# Patient Record
Sex: FEMALE | Race: BLACK OR AFRICAN AMERICAN | NOT HISPANIC OR LATINO | Employment: FULL TIME | URBAN - METROPOLITAN AREA
[De-identification: names, ages, dates, MRNs, and addresses within clinical notes are randomized per-mention and may not be internally consistent; named-entity substitution may affect disease eponyms.]

---

## 2024-05-08 ENCOUNTER — LAB (OUTPATIENT)
Dept: LAB | Facility: LAB | Age: 24
End: 2024-05-08

## 2024-05-08 ENCOUNTER — INITIAL PRENATAL (OUTPATIENT)
Dept: OBSTETRICS AND GYNECOLOGY | Facility: HOSPITAL | Age: 24
End: 2024-05-08

## 2024-05-08 VITALS
WEIGHT: 250 LBS | SYSTOLIC BLOOD PRESSURE: 124 MMHG | HEIGHT: 63 IN | BODY MASS INDEX: 44.3 KG/M2 | DIASTOLIC BLOOD PRESSURE: 77 MMHG

## 2024-05-08 DIAGNOSIS — Z32.01 PREGNANCY TEST POSITIVE (HHS-HCC): Primary | ICD-10-CM

## 2024-05-08 DIAGNOSIS — Z12.4 SCREENING FOR CERVICAL CANCER: ICD-10-CM

## 2024-05-08 DIAGNOSIS — Z32.01 PREGNANCY TEST POSITIVE (HHS-HCC): ICD-10-CM

## 2024-05-08 LAB
ABO GROUP (TYPE) IN BLOOD: NORMAL
ANTIBODY SCREEN: NORMAL
ERYTHROCYTE [DISTWIDTH] IN BLOOD BY AUTOMATED COUNT: 16 % (ref 11.5–14.5)
EST. AVERAGE GLUCOSE BLD GHB EST-MCNC: 117 MG/DL
FERRITIN SERPL-MCNC: 15 NG/ML
HBA1C MFR BLD: 5.7 %
HBV SURFACE AG SERPL QL IA: NONREACTIVE
HCT VFR BLD AUTO: 32.3 % (ref 36–46)
HCV AB SER QL: NONREACTIVE
HGB BLD-MCNC: 10.1 G/DL (ref 12–16)
HIV 1+2 AB+HIV1 P24 AG SERPL QL IA: NONREACTIVE
IRON SATN MFR SERPL: 12 %
IRON SERPL-MCNC: 47 UG/DL
MCH RBC QN AUTO: 24.8 PG (ref 26–34)
MCHC RBC AUTO-ENTMCNC: 31.3 G/DL (ref 32–36)
MCV RBC AUTO: 79 FL (ref 80–100)
NRBC BLD-RTO: 0 /100 WBCS (ref 0–0)
PLATELET # BLD AUTO: 300 X10*3/UL (ref 150–450)
PREGNANCY TEST URINE, POC: POSITIVE
RBC # BLD AUTO: 4.07 X10*6/UL (ref 4–5.2)
REFLEX ADDED, ANEMIA PANEL: NORMAL
RH FACTOR (ANTIGEN D): NORMAL
RUBV IGG SERPL IA-ACNC: 1.4 IA
RUBV IGG SERPL QL IA: POSITIVE
TIBC SERPL-MCNC: 386 UG/DL
TREPONEMA PALLIDUM IGG+IGM AB [PRESENCE] IN SERUM OR PLASMA BY IMMUNOASSAY: NONREACTIVE
UIBC SERPL-MCNC: 339 UG/DL
WBC # BLD AUTO: 4.3 X10*3/UL (ref 4.4–11.3)

## 2024-05-08 PROCEDURE — 83021 HEMOGLOBIN CHROMOTOGRAPHY: CPT

## 2024-05-08 PROCEDURE — 87340 HEPATITIS B SURFACE AG IA: CPT

## 2024-05-08 PROCEDURE — 83036 HEMOGLOBIN GLYCOSYLATED A1C: CPT

## 2024-05-08 PROCEDURE — 86780 TREPONEMA PALLIDUM: CPT

## 2024-05-08 PROCEDURE — 86850 RBC ANTIBODY SCREEN: CPT

## 2024-05-08 PROCEDURE — 0500F INITIAL PRENATAL CARE VISIT: CPT

## 2024-05-08 PROCEDURE — 85027 COMPLETE CBC AUTOMATED: CPT

## 2024-05-08 PROCEDURE — 83020 HEMOGLOBIN ELECTROPHORESIS: CPT

## 2024-05-08 PROCEDURE — 87491 CHLMYD TRACH DNA AMP PROBE: CPT

## 2024-05-08 PROCEDURE — 36415 COLL VENOUS BLD VENIPUNCTURE: CPT

## 2024-05-08 PROCEDURE — 86317 IMMUNOASSAY INFECTIOUS AGENT: CPT

## 2024-05-08 PROCEDURE — 86900 BLOOD TYPING SEROLOGIC ABO: CPT

## 2024-05-08 PROCEDURE — 87086 URINE CULTURE/COLONY COUNT: CPT

## 2024-05-08 PROCEDURE — 82728 ASSAY OF FERRITIN: CPT

## 2024-05-08 PROCEDURE — 83550 IRON BINDING TEST: CPT

## 2024-05-08 PROCEDURE — 87661 TRICHOMONAS VAGINALIS AMPLIF: CPT

## 2024-05-08 PROCEDURE — 86803 HEPATITIS C AB TEST: CPT

## 2024-05-08 PROCEDURE — 86901 BLOOD TYPING SEROLOGIC RH(D): CPT

## 2024-05-08 PROCEDURE — 81025 URINE PREGNANCY TEST: CPT

## 2024-05-08 PROCEDURE — 87389 HIV-1 AG W/HIV-1&-2 AB AG IA: CPT

## 2024-05-08 PROCEDURE — 88175 CYTOPATH C/V AUTO FLUID REDO: CPT | Mod: TC,GCY

## 2024-05-08 RX ORDER — NAPROXEN SODIUM 220 MG/1
162 TABLET, FILM COATED ORAL DAILY
Qty: 180 TABLET | Refills: 3 | Status: SHIPPED | OUTPATIENT
Start: 2024-05-08 | End: 2025-05-08

## 2024-05-08 RX ORDER — FERROUS SULFATE 325(65) MG
325 TABLET ORAL
Qty: 90 TABLET | Refills: 3 | Status: SHIPPED | OUTPATIENT
Start: 2024-05-08 | End: 2025-05-08

## 2024-05-08 SDOH — ECONOMIC STABILITY: TRANSPORTATION INSECURITY
IN THE PAST 12 MONTHS, HAS THE LACK OF TRANSPORTATION KEPT YOU FROM MEDICAL APPOINTMENTS OR FROM GETTING MEDICATIONS?: NO

## 2024-05-08 SDOH — ECONOMIC STABILITY: FOOD INSECURITY: WITHIN THE PAST 12 MONTHS, YOU WORRIED THAT YOUR FOOD WOULD RUN OUT BEFORE YOU GOT MONEY TO BUY MORE.: NEVER TRUE

## 2024-05-08 SDOH — ECONOMIC STABILITY: FOOD INSECURITY: WITHIN THE PAST 12 MONTHS, THE FOOD YOU BOUGHT JUST DIDN'T LAST AND YOU DIDN'T HAVE MONEY TO GET MORE.: NEVER TRUE

## 2024-05-08 SDOH — ECONOMIC STABILITY: TRANSPORTATION INSECURITY
IN THE PAST 12 MONTHS, HAS LACK OF TRANSPORTATION KEPT YOU FROM MEETINGS, WORK, OR FROM GETTING THINGS NEEDED FOR DAILY LIVING?: NO

## 2024-05-08 ASSESSMENT — EDINBURGH POSTNATAL DEPRESSION SCALE (EPDS)
I HAVE LOOKED FORWARD WITH ENJOYMENT TO THINGS: AS MUCH AS I EVER DID
THINGS HAVE BEEN GETTING ON TOP OF ME: NO, MOST OF THE TIME I HAVE COPED QUITE WELL
I HAVE BLAMED MYSELF UNNECESSARILY WHEN THINGS WENT WRONG: NOT VERY OFTEN
I HAVE BEEN SO UNHAPPY THAT I HAVE HAD DIFFICULTY SLEEPING: NOT AT ALL
I HAVE FELT SAD OR MISERABLE: NO, NOT AT ALL
I HAVE BEEN ANXIOUS OR WORRIED FOR NO GOOD REASON: YES, SOMETIMES
I HAVE FELT SCARED OR PANICKY FOR NO GOOD REASON: YES, SOMETIMES
I HAVE BEEN ABLE TO LAUGH AND SEE THE FUNNY SIDE OF THINGS: NOT QUITE SO MUCH NOW
I HAVE BEEN SO UNHAPPY THAT I HAVE BEEN CRYING: NO, NEVER
TOTAL SCORE: 7
THE THOUGHT OF HARMING MYSELF HAS OCCURRED TO ME: NEVER

## 2024-05-08 ASSESSMENT — SOCIAL DETERMINANTS OF HEALTH (SDOH)
WITHIN THE LAST YEAR, HAVE YOU BEEN KICKED, HIT, SLAPPED, OR OTHERWISE PHYSICALLY HURT BY YOUR PARTNER OR EX-PARTNER?: NO
WITHIN THE LAST YEAR, HAVE YOU BEEN AFRAID OF YOUR PARTNER OR EX-PARTNER?: NO
HOW HARD IS IT FOR YOU TO PAY FOR THE VERY BASICS LIKE FOOD, HOUSING, MEDICAL CARE, AND HEATING?: NOT HARD AT ALL
WITHIN THE LAST YEAR, HAVE TO BEEN RAPED OR FORCED TO HAVE ANY KIND OF SEXUAL ACTIVITY BY YOUR PARTNER OR EX-PARTNER?: NO
WITHIN THE LAST YEAR, HAVE YOU BEEN HUMILIATED OR EMOTIONALLY ABUSED IN OTHER WAYS BY YOUR PARTNER OR EX-PARTNER?: NO

## 2024-05-08 NOTE — PROGRESS NOTES
Assessment   Problem List Items Addressed This Visit    None  Visit Diagnoses       Pregnancy test positive ([facility])    -  Primary    Relevant Medications    prenatal vitamin, iron-folic, 27 mg iron-800 mcg folic acid tablet    ferrous sulfate, 325 mg ferrous sulfate, tablet    aspirin 81 mg chewable tablet    Other Relevant Orders    [facility] OB imaging order    Hemoglobin A1C    Type And Screen    CBC Anemia Panel With Reflex,Pregnancy    Hemoglobin Identification with Path Review    Hepatitis C Antibody    Hepatitis B Surface Antigen    Rubella Antibody, IgG    Syphilis Screen with Reflex    HIV 1/2 Antigen/Antibody Screen with Reflex to Confirmation    Urine Culture    Trichomonas vaginalis, Nucleic Acid Detection    C. Trachomatis / N. Gonorrhoeae, Amplified Detection    Follow Up In Obstetrics    Screening for cervical cancer        Relevant Orders    THINPREP PAP TEST           Plan   - New OB resources provided and reviewed with particular attention to dietary, travel, and medication restrictions  - Oriented to practice, CNM vs. MD care  - Reviewed IOM recommendations for weight gain given pt's BMI: 11-20 pounds (BMI greater than or equal to 30)  - Reviewed bleeding precautions, warning signs, when to call provider; phone number provided  - Discussed bASA for PEC prophylaxis  - The following Rx were sent to pharmacy: PNV, ASA, iron  - Routine NOB labs ordered  - Nuchal Translucency ultrasound ordered  - Return in 4 weeks for routine prenatal care    MARÍA Nuñez-CNM    Subjective   Dia Aaron is a 24 y.o.  at 10w0d with a working estimated date of delivery of 2024, by Last Menstrual Period who presents for an initial prenatal visit. This pregnancy is unplanned.    Patient currently experiencing: nausea, declines anti-emetics, fatigue  Bleeding or cramping since LMP: no  Taking prenatal vitamin: No  Ultrasound completed this pregnancy: No    Last pap: never; completed today    OB  History    Para Term  AB Living   2       1     SAB IAB Ectopic Multiple Live Births                  # Outcome Date GA Lbr Joss/2nd Weight Sex Delivery Anes PTL Lv   2 Current            1 AB              [place]  Depression Scale Total: 7  Prior pregnancy complications:  N/A    History of hypertension:  No    History reviewed. No pertinent past medical history.   History reviewed. No pertinent surgical history.   Social History     Socioeconomic History    Marital status: Single     Spouse name: None    Number of children: None    Years of education: None    Highest education level: None   Occupational History    None   Tobacco Use    Smoking status: Never    Smokeless tobacco: Never   Vaping Use    Vaping status: Never Used   Substance and Sexual Activity    Alcohol use: Yes    Drug use: Yes     Types: Marijuana    Sexual activity: Yes     Partners: Male   Other Topics Concern    None   Social History Narrative    None     Social Determinants of Health     Financial Resource Strain: Low Risk  (2024)    Overall Financial Resource Strain (CARDIA)     Difficulty of Paying Living Expenses: Not hard at all   Food Insecurity: No Food Insecurity (2024)    Hunger Vital Sign     Worried About Running Out of Food in the Last Year: Never true     Ran Out of Food in the Last Year: Never true   Transportation Needs: No Transportation Needs (2024)    PRAPARE - Transportation     Lack of Transportation (Medical): No     Lack of Transportation (Non-Medical): No   Physical Activity: Not on file   Stress: Not on file   Social Connections: Not on file   Intimate Partner Violence: Not At Risk (2024)    Humiliation, Afraid, Rape, and Kick questionnaire     Fear of Current or Ex-Partner: No     Emotionally Abused: No     Physically Abused: No     Sexually Abused: No        Objective   Physical Exam  Weight: 113 kg (250 lb)  TW kg (0 lb)   Expected Total Weight Gain: 5 kg (11 lb)-9 kg (19 lb)    Pregravid BMI: 44.30  BP: 124/77    Physical Exam  Constitutional:       Appearance: Normal appearance.   Genitourinary:      Vulva, bladder, rectum and urethral meatus normal.      No vaginal prolapse present.     No vaginal atrophy present.     Pelvic exam was performed with patient in the lithotomy position and normal support.   HENT:      Mouth/Throat:      Mouth: Mucous membranes are moist.   Cardiovascular:      Rate and Rhythm: Normal rate and regular rhythm.      Heart sounds: Normal heart sounds.   Pulmonary:      Effort: Pulmonary effort is normal.      Breath sounds: Normal breath sounds.   Musculoskeletal:         General: Normal range of motion.      Cervical back: Normal range of motion and neck supple.   Neurological:      Mental Status: She is alert and oriented to person, place, and time.   Skin:     General: Skin is warm and dry.   Psychiatric:         Mood and Affect: Mood normal.         Behavior: Behavior normal.         Thought Content: Thought content normal.         Judgment: Judgment normal.

## 2024-05-09 LAB
BACTERIA UR CULT: NORMAL
C TRACH RRNA SPEC QL NAA+PROBE: NEGATIVE
HEMOGLOBIN A2: 2.6 % (ref 2–3.5)
HEMOGLOBIN A: 97 % (ref 95.8–98)
HEMOGLOBIN F: 0.4 % (ref 0–2)
HEMOGLOBIN IDENTIFICATION INTERPRETATION: NORMAL
N GONORRHOEA DNA SPEC QL PROBE+SIG AMP: NEGATIVE
PATH REVIEW-HGB IDENTIFICATION: NORMAL
T VAGINALIS RRNA SPEC QL NAA+PROBE: NEGATIVE

## 2024-05-19 LAB
CYTOLOGY CMNT CVX/VAG CYTO-IMP: NORMAL
LAB AP HPV HR: NORMAL
LABORATORY COMMENT REPORT: NORMAL
LMP START DATE: NORMAL
MENSTRUAL HX REPORTED: NORMAL
PATH REPORT.TOTAL CANCER: NORMAL

## 2024-06-05 ENCOUNTER — HOSPITAL ENCOUNTER (OUTPATIENT)
Dept: RADIOLOGY | Facility: HOSPITAL | Age: 24
Discharge: HOME | End: 2024-06-05

## 2024-06-05 ENCOUNTER — ROUTINE PRENATAL (OUTPATIENT)
Dept: OBSTETRICS AND GYNECOLOGY | Facility: HOSPITAL | Age: 24
End: 2024-06-05

## 2024-06-05 VITALS — DIASTOLIC BLOOD PRESSURE: 68 MMHG | WEIGHT: 240 LBS | BODY MASS INDEX: 42.51 KG/M2 | SYSTOLIC BLOOD PRESSURE: 119 MMHG

## 2024-06-05 DIAGNOSIS — Z3A.14 14 WEEKS GESTATION OF PREGNANCY (HHS-HCC): Primary | ICD-10-CM

## 2024-06-05 DIAGNOSIS — Z32.01 PREGNANCY TEST POSITIVE (HHS-HCC): ICD-10-CM

## 2024-06-05 DIAGNOSIS — O99.210 OBESITY IN PREGNANCY (HHS-HCC): ICD-10-CM

## 2024-06-05 PROCEDURE — 76801 OB US < 14 WKS SINGLE FETUS: CPT

## 2024-06-05 PROCEDURE — 76801 OB US < 14 WKS SINGLE FETUS: CPT | Performed by: OBSTETRICS & GYNECOLOGY

## 2024-06-05 PROCEDURE — 0501F PRENATAL FLOW SHEET: CPT

## 2024-06-05 NOTE — PROGRESS NOTES
Subjective     Dia Aaron is a 24 y.o.  at 14w0d with a working estimated date of delivery of 2024, by Last Menstrual Period who presents for a routine prenatal visit.     She denies vaginal bleeding, leakage of fluid, decreased fetal movements, or contractions.    Objective   Physical Exam  Weight: 109 kg (240 lb)  TWG: -4.536 kg (-10 lb)  BP: 119/68  Fetal Heart Rate: 155    Postpartum Depression: Medium Risk (2024)    [place]  Depression Scale     Last EPDS Total Score: 7     Last EPDS Self Harm Result: Never       Assessment/Plan   Problem List Items Addressed This Visit       Obesity in pregnancy ([facility])    Overview     Pre-pregnancy BMI 44  Growth 30, 36 weeks  Weekly  testing beginning at 34 weeks          Other Visit Diagnoses       14 weeks gestation of pregnancy ([facility])    -  Primary    Relevant Orders    Follow Up In Obstetrics     Anatomy US scheduled  NT ultrasound today at the conclusion of this appointment  Reviewed s/sx of PTL, warning signs, fetal movement counts, and when to call provider  Follow up in 4 weeks for a routine prenatal visit.    Nathalie Dhaliwal, MARÍA-WHITNEY

## 2024-07-03 ENCOUNTER — ROUTINE PRENATAL (OUTPATIENT)
Dept: OBSTETRICS AND GYNECOLOGY | Facility: HOSPITAL | Age: 24
End: 2024-07-03

## 2024-07-03 VITALS — BODY MASS INDEX: 44.11 KG/M2 | SYSTOLIC BLOOD PRESSURE: 124 MMHG | WEIGHT: 249 LBS | DIASTOLIC BLOOD PRESSURE: 76 MMHG

## 2024-07-03 DIAGNOSIS — O99.012 ANEMIA DURING PREGNANCY IN SECOND TRIMESTER (HHS-HCC): ICD-10-CM

## 2024-07-03 DIAGNOSIS — Z3A.18 18 WEEKS GESTATION OF PREGNANCY (HHS-HCC): Primary | ICD-10-CM

## 2024-07-03 DIAGNOSIS — O99.210 OBESITY IN PREGNANCY (HHS-HCC): ICD-10-CM

## 2024-07-03 PROCEDURE — 0501F PRENATAL FLOW SHEET: CPT

## 2024-07-03 NOTE — PROGRESS NOTES
Subjective     Dia Aaron is a 24 y.o.  at 18w0d with a working estimated date of delivery of 2024, by Last Menstrual Period who presents for a routine prenatal visit.     She denies vaginal bleeding, leakage of fluid, decreased fetal movements, or contractions.    Objective   Physical Exam  Weight: 113 kg (249 lb)  TWG: -0.454 kg (-1 lb)  BP: 124/76  Fetal Heart Rate: 157    Postpartum Depression: Medium Risk (2024)    [place]  Depression Scale     Last EPDS Total Score: 7     Last EPDS Self Harm Result: Never     Assessment/Plan   Problem List Items Addressed This Visit       Obesity in pregnancy ([facility])    Overview     Pre-pregnancy BMI 44  Growth 30, 36 weeks  Weekly  testing beginning at 34 weeks         Anemia during pregnancy in second trimester ([facility])    Overview     Hemoglobin 10.1 on   PO iron ordered  Repeat at 24-28 week labs          Other Visit Diagnoses       18 weeks gestation of pregnancy ([facility])    -  Primary    Relevant Orders    Follow Up In Obstetrics          Anatomy US scheduled  Reviewed s/sx of PTL, warning signs, fetal movement counts, and when to call provider  Follow up in 4 weeks for a routine prenatal visit.    MARÍA Nuñez-WHITNEY

## 2024-07-17 ENCOUNTER — HOSPITAL ENCOUNTER (OUTPATIENT)
Dept: RADIOLOGY | Facility: HOSPITAL | Age: 24
Discharge: HOME | End: 2024-07-17

## 2024-07-17 DIAGNOSIS — Z32.01 PREGNANCY TEST POSITIVE (HHS-HCC): ICD-10-CM

## 2024-07-17 PROCEDURE — 76811 OB US DETAILED SNGL FETUS: CPT | Performed by: OBSTETRICS & GYNECOLOGY

## 2024-07-17 PROCEDURE — 76811 OB US DETAILED SNGL FETUS: CPT

## 2024-07-28 ENCOUNTER — HOSPITAL ENCOUNTER (OUTPATIENT)
Facility: HOSPITAL | Age: 24
Discharge: HOME | End: 2024-07-28
Attending: OBSTETRICS & GYNECOLOGY | Admitting: OBSTETRICS & GYNECOLOGY

## 2024-07-28 ENCOUNTER — HOSPITAL ENCOUNTER (OUTPATIENT)
Facility: HOSPITAL | Age: 24
End: 2024-07-28
Attending: OBSTETRICS & GYNECOLOGY | Admitting: OBSTETRICS & GYNECOLOGY

## 2024-07-28 VITALS
RESPIRATION RATE: 18 BRPM | WEIGHT: 251.1 LBS | SYSTOLIC BLOOD PRESSURE: 136 MMHG | BODY MASS INDEX: 44.48 KG/M2 | TEMPERATURE: 97.9 F | DIASTOLIC BLOOD PRESSURE: 66 MMHG | HEART RATE: 83 BPM

## 2024-07-28 PROBLEM — Z34.02 PRIMIGRAVIDA IN SECOND TRIMESTER (HHS-HCC): Status: ACTIVE | Noted: 2024-07-28

## 2024-07-28 PROBLEM — R10.2 PAIN OF ROUND LIGAMENT DURING PREGNANCY (HHS-HCC): Status: ACTIVE | Noted: 2024-07-28

## 2024-07-28 PROBLEM — R19.8: Status: ACTIVE | Noted: 2024-07-28

## 2024-07-28 PROBLEM — Z3A.21 21 WEEKS GESTATION OF PREGNANCY (HHS-HCC): Status: ACTIVE | Noted: 2024-07-28

## 2024-07-28 PROBLEM — O26.892: Status: ACTIVE | Noted: 2024-07-28

## 2024-07-28 PROBLEM — O26.899 PAIN OF ROUND LIGAMENT DURING PREGNANCY (HHS-HCC): Status: ACTIVE | Noted: 2024-07-28

## 2024-07-28 PROCEDURE — 2500000001 HC RX 250 WO HCPCS SELF ADMINISTERED DRUGS (ALT 637 FOR MEDICARE OP): Performed by: ADVANCED PRACTICE MIDWIFE

## 2024-07-28 PROCEDURE — 99213 OFFICE O/P EST LOW 20 MIN: CPT

## 2024-07-28 PROCEDURE — 99213 OFFICE O/P EST LOW 20 MIN: CPT | Performed by: ADVANCED PRACTICE MIDWIFE

## 2024-07-28 RX ORDER — LIDOCAINE HYDROCHLORIDE 10 MG/ML
0.5 INJECTION, SOLUTION EPIDURAL; INFILTRATION; INTRACAUDAL; PERINEURAL ONCE AS NEEDED
Status: DISCONTINUED | OUTPATIENT
Start: 2024-07-28 | End: 2024-07-28 | Stop reason: HOSPADM

## 2024-07-28 RX ORDER — ACETAMINOPHEN 325 MG/1
975 TABLET ORAL EVERY 6 HOURS PRN
Status: DISCONTINUED | OUTPATIENT
Start: 2024-07-28 | End: 2024-07-28 | Stop reason: HOSPADM

## 2024-07-28 RX ORDER — HYDRALAZINE HYDROCHLORIDE 20 MG/ML
5 INJECTION INTRAMUSCULAR; INTRAVENOUS ONCE AS NEEDED
Status: DISCONTINUED | OUTPATIENT
Start: 2024-07-28 | End: 2024-07-28 | Stop reason: HOSPADM

## 2024-07-28 RX ORDER — LABETALOL HYDROCHLORIDE 5 MG/ML
20 INJECTION, SOLUTION INTRAVENOUS ONCE AS NEEDED
Status: DISCONTINUED | OUTPATIENT
Start: 2024-07-28 | End: 2024-07-28 | Stop reason: HOSPADM

## 2024-07-28 RX ORDER — FAMOTIDINE 20 MG/1
20 TABLET, FILM COATED ORAL 2 TIMES DAILY
Status: DISCONTINUED | OUTPATIENT
Start: 2024-07-28 | End: 2024-07-28 | Stop reason: HOSPADM

## 2024-07-28 RX ORDER — NIFEDIPINE 10 MG/1
10 CAPSULE ORAL ONCE AS NEEDED
Status: DISCONTINUED | OUTPATIENT
Start: 2024-07-28 | End: 2024-07-28 | Stop reason: HOSPADM

## 2024-07-28 RX ORDER — ONDANSETRON HYDROCHLORIDE 2 MG/ML
4 INJECTION, SOLUTION INTRAVENOUS EVERY 6 HOURS PRN
Status: DISCONTINUED | OUTPATIENT
Start: 2024-07-28 | End: 2024-07-28 | Stop reason: HOSPADM

## 2024-07-28 RX ORDER — ONDANSETRON 4 MG/1
4 TABLET, FILM COATED ORAL EVERY 6 HOURS PRN
Status: DISCONTINUED | OUTPATIENT
Start: 2024-07-28 | End: 2024-07-28 | Stop reason: HOSPADM

## 2024-07-28 RX ADMIN — FAMOTIDINE 20 MG: 20 TABLET, FILM COATED ORAL at 11:40

## 2024-07-28 RX ADMIN — ACETAMINOPHEN 975 MG: 325 TABLET ORAL at 11:40

## 2024-07-28 ASSESSMENT — PAIN SCALES - GENERAL
PAINLEVEL_OUTOF10: 3
PAINLEVEL_OUTOF10: 5 - MODERATE PAIN
PAINLEVEL_OUTOF10: 7

## 2024-07-28 NOTE — H&P
Obstetrical Triage Note    Reason for Triage Observation: Abdominal Pain (LUQ pain)    Assessment   pain in abdomen  Triage Testing revealed No uterine contractions,   Patient's complaints have improved.   Ligament pain and heartburn    Plan   Pepcid and Tylenol given reports continuing improvement of pain  Discharge home. Follow up as scheduled for next prenatal visit    Subjective   History of Present Pregnancy  Dia Aaron is a 24 y.o.  gravid, female. Patient's last menstrual period was 2024. with an Estimated Date of Delivery of 2024, by Last Menstrual Period, who is now 21w4d gestation. The patient's blood type is A POS. Presents to triage from work with c/o LUQ pain that started yesterday and has improved for a 10 last night to a 7 this morning.  Ate pizza last night. No intercourse, denies bleeding or leakage of fluid, feels fetal movement    Pregnancy notable for: BMI 44, anemia    Objective   Recent Vital Signs:                                 /66   Pulse 83   Temp 36.6 °C (97.9 °F) (Axillary)   Resp 18   Wt 114 kg (251 lb 1.7 oz)   BMI 44.48 kg/m²     BP & Temp Min/Max Last 24 Hours:     BP  Min: 136/66   Min taken time: 24 1009  Max: 136/66   Max taken time: 24 1009  Temp  Av.6 °C (97.9 °F)  Min: 36.6 °C (97.9 °F)   Min taken time: 24 1009  Max: 36.6 °C (97.9 °F)   Max taken time: 24 1009    Physical Examination:  Constitutional: Alert and in no acute distress. Well developed, well nourished.  Head and Face: Head and face: Normal.   External inspection of ears and nose: Normal.  Pulmonary: Normal respiratory effort.  Uterus: Normal.   Psychiatric: Alert and oriented x 3. Affect normal to patient baseline. Mood: Appropriate  Fetal:   No ctxs per EFM for 1 hour    MARÍA Robles-WHITNEY

## 2024-07-30 ENCOUNTER — APPOINTMENT (OUTPATIENT)
Dept: OBSTETRICS AND GYNECOLOGY | Facility: HOSPITAL | Age: 24
End: 2024-07-30

## 2024-08-20 ENCOUNTER — ROUTINE PRENATAL (OUTPATIENT)
Dept: OBSTETRICS AND GYNECOLOGY | Facility: HOSPITAL | Age: 24
End: 2024-08-20

## 2024-08-20 ENCOUNTER — LAB (OUTPATIENT)
Dept: LAB | Facility: LAB | Age: 24
End: 2024-08-20

## 2024-08-20 VITALS — BODY MASS INDEX: 44.64 KG/M2 | SYSTOLIC BLOOD PRESSURE: 131 MMHG | DIASTOLIC BLOOD PRESSURE: 79 MMHG | WEIGHT: 252 LBS

## 2024-08-20 DIAGNOSIS — O99.210 OBESITY IN PREGNANCY (HHS-HCC): ICD-10-CM

## 2024-08-20 DIAGNOSIS — O99.012 ANEMIA DURING PREGNANCY IN SECOND TRIMESTER (HHS-HCC): ICD-10-CM

## 2024-08-20 DIAGNOSIS — Z3A.24 24 WEEKS GESTATION OF PREGNANCY (HHS-HCC): ICD-10-CM

## 2024-08-20 DIAGNOSIS — Z34.02 PRIMIGRAVIDA IN SECOND TRIMESTER (HHS-HCC): ICD-10-CM

## 2024-08-20 DIAGNOSIS — Z34.02 PRIMIGRAVIDA IN SECOND TRIMESTER (HHS-HCC): Primary | ICD-10-CM

## 2024-08-20 DIAGNOSIS — Z3A.18 18 WEEKS GESTATION OF PREGNANCY (HHS-HCC): ICD-10-CM

## 2024-08-20 LAB
ERYTHROCYTE [DISTWIDTH] IN BLOOD BY AUTOMATED COUNT: 14.9 % (ref 11.5–14.5)
FERRITIN SERPL-MCNC: 15 NG/ML
FERRITIN SERPL-MCNC: 19 NG/ML
FOLATE SERPL-MCNC: 18.6 NG/ML
GLUCOSE 1H P 50 G GLC PO SERPL-MCNC: 95 MG/DL
HCT VFR BLD AUTO: 31 % (ref 36–46)
HGB BLD-MCNC: 10.1 G/DL (ref 12–16)
HGB RETIC QN: 32 PG (ref 28–38)
IMMATURE RETIC FRACTION: 20.7 %
IRON SATN MFR SERPL: 17 %
IRON SERPL-MCNC: 66 UG/DL
MCH RBC QN AUTO: 28.5 PG (ref 26–34)
MCHC RBC AUTO-ENTMCNC: 32.6 G/DL (ref 32–36)
MCV RBC AUTO: 87 FL (ref 80–100)
NRBC BLD-RTO: 0 /100 WBCS (ref 0–0)
PLATELET # BLD AUTO: 242 X10*3/UL (ref 150–450)
RBC # BLD AUTO: 3.55 X10*6/UL (ref 4–5.2)
REFLEX ADDED, ANEMIA PANEL: NORMAL
RETICS #: 0.05 X10*6/UL (ref 0.02–0.08)
RETICS/RBC NFR AUTO: 1.4 % (ref 0.5–2)
TIBC SERPL-MCNC: 389 UG/DL
TREPONEMA PALLIDUM IGG+IGM AB [PRESENCE] IN SERUM OR PLASMA BY IMMUNOASSAY: NONREACTIVE
UIBC SERPL-MCNC: 323 UG/DL
VIT B12 SERPL-MCNC: 323 PG/ML
WBC # BLD AUTO: 4.5 X10*3/UL (ref 4.4–11.3)

## 2024-08-20 PROCEDURE — 36415 COLL VENOUS BLD VENIPUNCTURE: CPT

## 2024-08-20 PROCEDURE — 85045 AUTOMATED RETICULOCYTE COUNT: CPT

## 2024-08-20 PROCEDURE — 82607 VITAMIN B-12: CPT

## 2024-08-20 PROCEDURE — 83550 IRON BINDING TEST: CPT

## 2024-08-20 PROCEDURE — 86780 TREPONEMA PALLIDUM: CPT

## 2024-08-20 PROCEDURE — 82746 ASSAY OF FOLIC ACID SERUM: CPT

## 2024-08-20 PROCEDURE — 82728 ASSAY OF FERRITIN: CPT

## 2024-08-20 PROCEDURE — 0501F PRENATAL FLOW SHEET: CPT | Performed by: ADVANCED PRACTICE MIDWIFE

## 2024-08-20 PROCEDURE — 85027 COMPLETE CBC AUTOMATED: CPT

## 2024-08-20 PROCEDURE — 82947 ASSAY GLUCOSE BLOOD QUANT: CPT

## 2024-08-20 ASSESSMENT — EDINBURGH POSTNATAL DEPRESSION SCALE (EPDS)
TOTAL SCORE: 7
I HAVE BEEN SO UNHAPPY THAT I HAVE BEEN CRYING: ONLY OCCASIONALLY
I HAVE FELT SAD OR MISERABLE: NO, NOT AT ALL
I HAVE BEEN SO UNHAPPY THAT I HAVE HAD DIFFICULTY SLEEPING: NOT AT ALL
I HAVE BLAMED MYSELF UNNECESSARILY WHEN THINGS WENT WRONG: NOT VERY OFTEN
I HAVE BEEN ABLE TO LAUGH AND SEE THE FUNNY SIDE OF THINGS: AS MUCH AS I ALWAYS COULD
I HAVE BEEN ANXIOUS OR WORRIED FOR NO GOOD REASON: YES, SOMETIMES
I HAVE LOOKED FORWARD WITH ENJOYMENT TO THINGS: AS MUCH AS I EVER DID
I HAVE FELT SCARED OR PANICKY FOR NO GOOD REASON: YES, SOMETIMES
THE THOUGHT OF HARMING MYSELF HAS OCCURRED TO ME: NEVER
THINGS HAVE BEEN GETTING ON TOP OF ME: NO, MOST OF THE TIME I HAVE COPED QUITE WELL

## 2024-08-20 ASSESSMENT — ENCOUNTER SYMPTOMS
OCCASIONAL FEELINGS OF UNSTEADINESS: 0
LOSS OF SENSATION IN FEET: 0
DEPRESSION: 0

## 2024-08-20 NOTE — PROGRESS NOTES
Subjective     Dia Aaron is a 24 y.o.  at 24w6d with a working estimated date of delivery of 2024, by Last Menstrual Period who presents for a routine prenatal visit.     She denies vaginal bleeding, leakage of fluid, decreased fetal movements, or contractions.    Anemia - taking iron inconsistently - maybe once or twice a week     Feeling well, no complaints     Objective   Physical Exam  Weight: 114 kg (252 lb)  Expected Total Weight Gain: 5 kg (11 lb)-9 kg (19 lb)   Pregravid BMI: 44.30  BP: 131/79  Fetal Heart Rate: 152 Fundal Height (cm): 25 cm    Postpartum Depression: Medium Risk (2024)    [place]  Depression Scale     Last EPDS Total Score: 7     Last EPDS Self Harm Result: Never       Problem List Items Addressed This Visit       Primigravida in second trimester ([facility]) - Primary    Relevant Orders    Syphilis Screen with Reflex    CBC Anemia Panel With Reflex,Pregnancy    Glucose, 1 Hour Screen, Pregnancy    Obesity in pregnancy ([facility])    Overview     Pre-pregnancy BMI 44  Growth 30, 36 weeks  Weekly  testing beginning at 34 weeks         Anemia during pregnancy in second trimester ([facility])    Overview     Hemoglobin 10.1 on   PO iron ordered  Repeat at 24-28 week labs         24 weeks gestation of pregnancy ([facility])       Anatomy US reviewed WNL  Growth US scheduled for 30 wks for obesity   Discussed diabetes screening and routine labs, to be completed today  Anemia - taking iron inconsistently - will recheck levels today   Reviewed childbirth education options - list of courses give   Discussed Tdap at next visit   -Reviewed reasons to call CNM on-call: decreased fetal movement, vaginal bleeding, loss of fluid, increased pelvic pain/contractions, or any questions/concerns  -RTC in 4 weeks or prn      MARÍA Villagomez-WHITNEY, APRN-CNP

## 2024-08-21 ENCOUNTER — TELEPHONE (OUTPATIENT)
Dept: OBSTETRICS AND GYNECOLOGY | Facility: HOSPITAL | Age: 24
End: 2024-08-21

## 2024-08-21 NOTE — TELEPHONE ENCOUNTER
----- Message from Zoë Vazquez sent at 8/21/2024  6:43 AM EDT -----  Please call patient and encourage consistent iron intake BID every other day

## 2024-08-21 NOTE — TELEPHONE ENCOUNTER
"Result Communication    Resulted Orders   Syphilis Screen with Reflex   Result Value Ref Range    Syphilis Total Ab Nonreactive Nonreactive      Comment:      No significant level of Treponema pallidum antibody detected.   Repeat testing in 2 to 4 weeks may be considered if early   infection or incubating syphilis infection is suspected.   Glucose, 1 Hour Screen, Pregnancy   Result Value Ref Range    Glucose, 1 Hr Screen, Preg 95 <135 mg/dL    Narrative    Diagnostic value with glucose loading dose of 50 g. Reference values from American Diabetes Association, Diabetes Care; 47(Suppl.1):S20-S42   Ferritin, Pregnancy   Result Value Ref Range    Ferritin, Pregnancy 15 (L) >15 ng/mL   Reticulocytes   Result Value Ref Range    Retic % 1.4 0.5 - 2.0 %    Retic Absolute 0.050 0.018 - 0.083 x10*6/uL    Reticulocyte Hemoglobin 32 28 - 38 pg    Immature Retic fraction 20.7 (H) <=16.0 %      Comment:      Reticulocytes are measured based on a fluorescent technique. The IRF, or immature reticulocyte fraction, is the percent of reticulocytes that show medium (MFR) or high (HFR) fluorescence.  This value can be used to assess the relative maturity of the reticulocyte population in response to anemia. The \"shift reticulocytes\" are not measured by this technique, eliminating the need for their correction in the reticulocyte index.   CBC   Result Value Ref Range    WBC 4.5 4.4 - 11.3 x10*3/uL    nRBC 0.0 0.0 - 0.0 /100 WBCs    RBC 3.55 (L) 4.00 - 5.20 x10*6/uL    Hemoglobin 10.1 (L) 12.0 - 16.0 g/dL    Hematocrit 31.0 (L) 36.0 - 46.0 %    MCV 87 80 - 100 fL    MCH 28.5 26.0 - 34.0 pg    MCHC 32.6 32.0 - 36.0 g/dL    RDW 14.9 (H) 11.5 - 14.5 %    Platelets 242 150 - 450 x10*3/uL   CBC Anemia Panel with Reflex, Pregnancy   Result Value Ref Range    Reflex added, Anemia panel Ferritin, Vitamin B12, Folate, and TIBC    Ferritin, Pregnancy   Result Value Ref Range    Ferritin, Pregnancy 19 >15 ng/mL   Iron + TIBC Pregnancy   Result Value " Ref Range    Iron, Pregnancy 66 See below ug/dL      Comment:      IRON-PREGNANCY  Non-pregnancy Female 14-17y    28 - 175 ug/dL  Non-pregnancy Female  >=18y    35 - 150 ug/dL    First Trimester   72 - 143 ug/dL  Second Trimester  44 - 178 ug/dL  Third Trimester   30 - 193 ug/dL    Please note results will not flag based on reference ranges above.      UIBC, Pregnancy 323 ug/dL    TIBC, Pregnancy 389 ug/dL      Comment:      TIBC-PREGNANCY    Non-pregnancy     240 - 445 ug/dL  First Trimester   278 - 403 ug/dL  Second Trimester  325 - 514 ug/dL  Third Trimester   359 - 609 ug/dL    Please note results will not flag based on reference ranges above.    % Saturation, Pregnancy 17 %      Comment:      % SATURATION-PREGNANCY    Non-pregnancy      25 -  45 %  First Trimester     7 -  57 %  Second Trimester   10 -  44 %  Third Trimester     5 -  37 %    Please note results will not flag based on reference ranges above.   Vitamin B12, Pregnancy   Result Value Ref Range    Vitamin B12, Pregnancy 323 pg/mL    Narrative    Non-pregnancy    211 - 911 pg/mL  First Trimester      >=118 pg/mL  Second Trimester     >=130 pg/mL  Third Trimester      >= 99 pg/mL    Please note results will not flag based on reference ranges above.   Folate, Pregnancy   Result Value Ref Range    Folate, Pregnancy 18.6 >5.0 ng/mL    Narrative    Low           <3.4  Borderline 3.4-5.0  Normal        >5.0    Biotin interference may cause falsely elevated results. Patients taking a Biotin dose of up to 5 mg/day should refrain from taking Biotin for 24 hours before sample collection. Providers may contact their local laboratory for further information.       2:37 PM    Patient informed that she is anemic and reminded to continue taking iron twice daily every other day.   Patient reports she has been taking her iron supplements.   Patient verbalized understanding and denies any questions or concerns.  Results were successfully communicated with the patient  and they acknowledged their understanding.

## 2024-09-01 ENCOUNTER — HOSPITAL ENCOUNTER (OUTPATIENT)
Facility: HOSPITAL | Age: 24
Discharge: HOME | End: 2024-09-01
Attending: OBSTETRICS & GYNECOLOGY | Admitting: OBSTETRICS & GYNECOLOGY

## 2024-09-01 ENCOUNTER — HOSPITAL ENCOUNTER (OUTPATIENT)
Facility: HOSPITAL | Age: 24
End: 2024-09-01
Attending: OBSTETRICS & GYNECOLOGY | Admitting: OBSTETRICS & GYNECOLOGY

## 2024-09-01 VITALS
HEIGHT: 63 IN | RESPIRATION RATE: 19 BRPM | HEART RATE: 44 BPM | BODY MASS INDEX: 44.92 KG/M2 | OXYGEN SATURATION: 84 % | WEIGHT: 253.53 LBS | SYSTOLIC BLOOD PRESSURE: 121 MMHG | TEMPERATURE: 97.5 F | DIASTOLIC BLOOD PRESSURE: 61 MMHG

## 2024-09-01 DIAGNOSIS — B96.89 BACTERIAL VAGINOSIS IN PREGNANCY (HHS-HCC): Primary | ICD-10-CM

## 2024-09-01 DIAGNOSIS — O23.599 BACTERIAL VAGINOSIS IN PREGNANCY (HHS-HCC): Primary | ICD-10-CM

## 2024-09-01 LAB
CLUE CELLS SPEC QL WET PREP: PRESENT
T VAGINALIS SPEC QL WET PREP: ABNORMAL
WBC VAG QL WET PREP: ABNORMAL
YEAST VAG QL WET PREP: ABNORMAL

## 2024-09-01 PROCEDURE — 59025 FETAL NON-STRESS TEST: CPT | Performed by: ADVANCED PRACTICE MIDWIFE

## 2024-09-01 PROCEDURE — 99214 OFFICE O/P EST MOD 30 MIN: CPT | Mod: 25

## 2024-09-01 PROCEDURE — 87210 SMEAR WET MOUNT SALINE/INK: CPT | Performed by: ADVANCED PRACTICE MIDWIFE

## 2024-09-01 PROCEDURE — 99203 OFFICE O/P NEW LOW 30 MIN: CPT | Performed by: ADVANCED PRACTICE MIDWIFE

## 2024-09-01 RX ORDER — NIFEDIPINE 10 MG/1
10 CAPSULE ORAL ONCE AS NEEDED
Status: DISCONTINUED | OUTPATIENT
Start: 2024-09-01 | End: 2024-09-01 | Stop reason: HOSPADM

## 2024-09-01 RX ORDER — LIDOCAINE HYDROCHLORIDE 10 MG/ML
0.5 INJECTION, SOLUTION EPIDURAL; INFILTRATION; INTRACAUDAL; PERINEURAL ONCE AS NEEDED
Status: DISCONTINUED | OUTPATIENT
Start: 2024-09-01 | End: 2024-09-01 | Stop reason: HOSPADM

## 2024-09-01 RX ORDER — METRONIDAZOLE 500 MG/1
500 TABLET ORAL 2 TIMES DAILY
Qty: 14 TABLET | Refills: 0 | Status: SHIPPED | OUTPATIENT
Start: 2024-09-01

## 2024-09-01 RX ORDER — LABETALOL HYDROCHLORIDE 5 MG/ML
20 INJECTION, SOLUTION INTRAVENOUS ONCE AS NEEDED
Status: DISCONTINUED | OUTPATIENT
Start: 2024-09-01 | End: 2024-09-01 | Stop reason: HOSPADM

## 2024-09-01 RX ORDER — HYDRALAZINE HYDROCHLORIDE 20 MG/ML
5 INJECTION INTRAMUSCULAR; INTRAVENOUS ONCE AS NEEDED
Status: DISCONTINUED | OUTPATIENT
Start: 2024-09-01 | End: 2024-09-01 | Stop reason: HOSPADM

## 2024-09-01 RX ORDER — ONDANSETRON HYDROCHLORIDE 2 MG/ML
4 INJECTION, SOLUTION INTRAVENOUS EVERY 6 HOURS PRN
Status: DISCONTINUED | OUTPATIENT
Start: 2024-09-01 | End: 2024-09-01 | Stop reason: HOSPADM

## 2024-09-01 RX ORDER — ONDANSETRON 4 MG/1
4 TABLET, FILM COATED ORAL EVERY 6 HOURS PRN
Status: DISCONTINUED | OUTPATIENT
Start: 2024-09-01 | End: 2024-09-01 | Stop reason: HOSPADM

## 2024-09-01 SDOH — SOCIAL STABILITY: SOCIAL INSECURITY: HAVE YOU HAD THOUGHTS OF HARMING ANYONE ELSE?: NO

## 2024-09-01 SDOH — SOCIAL STABILITY: SOCIAL INSECURITY: ARE THERE ANY APPARENT SIGNS OF INJURIES/BEHAVIORS THAT COULD BE RELATED TO ABUSE/NEGLECT?: NO

## 2024-09-01 SDOH — SOCIAL STABILITY: SOCIAL INSECURITY: DOES ANYONE TRY TO KEEP YOU FROM HAVING/CONTACTING OTHER FRIENDS OR DOING THINGS OUTSIDE YOUR HOME?: NO

## 2024-09-01 SDOH — HEALTH STABILITY: MENTAL HEALTH: SUICIDAL BEHAVIOR (LIFETIME): NO

## 2024-09-01 SDOH — SOCIAL STABILITY: SOCIAL INSECURITY: VERBAL ABUSE: DENIES

## 2024-09-01 SDOH — SOCIAL STABILITY: SOCIAL INSECURITY: PHYSICAL ABUSE: DENIES

## 2024-09-01 SDOH — SOCIAL STABILITY: SOCIAL INSECURITY: DO YOU FEEL ANYONE HAS EXPLOITED OR TAKEN ADVANTAGE OF YOU FINANCIALLY OR OF YOUR PERSONAL PROPERTY?: NO

## 2024-09-01 SDOH — HEALTH STABILITY: MENTAL HEALTH: HAVE YOU USED ANY SUBSTANCES (CANABIS, COCAINE, HEROIN, HALLUCINOGENS, INHALANTS, ETC.) IN THE PAST 12 MONTHS?: NO

## 2024-09-01 SDOH — SOCIAL STABILITY: SOCIAL INSECURITY: HAS ANYONE EVER THREATENED TO HURT YOUR FAMILY OR YOUR PETS?: NO

## 2024-09-01 SDOH — HEALTH STABILITY: MENTAL HEALTH: NON-SPECIFIC ACTIVE SUICIDAL THOUGHTS (PAST 1 MONTH): NO

## 2024-09-01 SDOH — SOCIAL STABILITY: SOCIAL INSECURITY: HAVE YOU HAD ANY THOUGHTS OF HARMING ANYONE ELSE?: NO

## 2024-09-01 SDOH — HEALTH STABILITY: MENTAL HEALTH: WERE YOU ABLE TO COMPLETE ALL THE BEHAVIORAL HEALTH SCREENINGS?: YES

## 2024-09-01 SDOH — ECONOMIC STABILITY: HOUSING INSECURITY: DO YOU FEEL UNSAFE GOING BACK TO THE PLACE WHERE YOU ARE LIVING?: NO

## 2024-09-01 SDOH — HEALTH STABILITY: MENTAL HEALTH: HAVE YOU USED ANY PRESCRIPTION DRUGS OTHER THAN PRESCRIBED IN THE PAST 12 MONTHS?: NO

## 2024-09-01 SDOH — HEALTH STABILITY: MENTAL HEALTH: WISH TO BE DEAD (PAST 1 MONTH): NO

## 2024-09-01 SDOH — SOCIAL STABILITY: SOCIAL INSECURITY: ABUSE SCREEN: ADULT

## 2024-09-01 ASSESSMENT — PATIENT HEALTH QUESTIONNAIRE - PHQ9
1. LITTLE INTEREST OR PLEASURE IN DOING THINGS: NOT AT ALL
2. FEELING DOWN, DEPRESSED OR HOPELESS: NOT AT ALL
SUM OF ALL RESPONSES TO PHQ9 QUESTIONS 1 & 2: 0

## 2024-09-01 ASSESSMENT — LIFESTYLE VARIABLES
HOW MANY STANDARD DRINKS CONTAINING ALCOHOL DO YOU HAVE ON A TYPICAL DAY: PATIENT DOES NOT DRINK
AUDIT-C TOTAL SCORE: 0
AUDIT-C TOTAL SCORE: 0
HOW OFTEN DO YOU HAVE 6 OR MORE DRINKS ON ONE OCCASION: NEVER
HOW OFTEN DO YOU HAVE A DRINK CONTAINING ALCOHOL: NEVER
SKIP TO QUESTIONS 9-10: 1

## 2024-09-01 ASSESSMENT — PAIN SCALES - GENERAL
PAINLEVEL_OUTOF10: 0 - NO PAIN

## 2024-09-01 ASSESSMENT — ACTIVITIES OF DAILY LIVING (ADL): LACK_OF_TRANSPORTATION: NO

## 2024-09-01 NOTE — H&P
Department of Obstetrics and Gynecology  Labor and Delivery Triage Note        CHIEF COMPLAINT:Vaginal bleeding/ post coital spotting    HISTORY OF PRESENT ILLNESS:      The patient is a 24 y.o.  GA 26w4d  OB History          2    Para        Term                AB   1    Living             SAB        IAB        Ectopic        Multiple        Live Births                   Patient presents with a chief complaint as above.  denies DFM/LOF/CTX denies odor, itching or irritation    Estimated Due Date: DEVANTE@ 24    PAST MEDICAL HISTORY: No past medical history on file.    PAST  SURGICAL HISTORY: No past surgical history on file.    SOCIAL HISTORY:     reports that she has never smoked. She has never used smokeless tobacco. She reports current alcohol use. She reports current drug use. Drug: Marijuana.    MEDICATIONS:    Prior to Admission medications    Medication Sig Start Date End Date Taking? Authorizing Provider   aspirin 81 mg chewable tablet Chew 2 tablets (162 mg) once daily. Begin when you are 12 weeks 24  FRANCO Nuñez   ferrous sulfate, 325 mg ferrous sulfate, tablet Take 1 tablet by mouth once daily with breakfast. 24  FRANCO Nuñez   metroNIDAZOLE (Flagyl) 500 mg tablet Take 1 tablet (500 mg) by mouth 2 times a day. 24   FRANCO Fitch   prenatal vitamin, iron-folic, 27 mg iron-800 mcg folic acid tablet Take 1 tablet by mouth once daily. 24  FRANCO Nuñez       PRENATAL CARE:    Complicated by:  anemia, obesity     REVIEW OF SYSTEMS:    Pertinent items are noted in HPI.    APPEARANCE:      Pain:  no      PHYSICAL EXAM:    Vital Signs: VS wnl-reviewed/Respirations normal effort    Vitals:    24 0912 24 0915 24 0917 24 1033   BP:   121/61    Pulse:  78 73 (!) 44   Resp:   19    Temp:   36.4 °C (97.5 °F)    TempSrc:   Temporal    SpO2:  99%  (!) 84%  "  Weight: 115 kg (253 lb 8.5 oz)      Height: 1.6 m (5' 3\")          Abdomen/Uterus:  gravid/non-tender    Speculum Exam:  no pooling of fluid seen, no blood in vaginal vault, no active bleeding seen from cervix.     Fetal heart rate:  Category I    Cervix:  cervix visual closed and thick    Contraction frequency:  none    Membranes:  intact        RESULTS:    NST: Reactive        GENERAL LABS:      Recent Results (from the past 24 hour(s))   Wet prep, genital    Collection Time: 09/01/24 10:17 AM   Result Value Ref Range    Trichomonas None Seen None Seen    Clue Cells Present (A) None Seen    Yeast None Seen None Seen    WBC 1-2        TRIAGE COURSE:    No active bleeding on assessment, cervix closed and thick on spec exam  Wet prep pos for BV  Patient declined GC/Ct cultures      IMPRESSION:    Post coital spotting  7 day course of flagyl sent to pharmacy in record    DISCUSSED WITH OB ATTENDING:  Dr. Olsen    DISPOSITION:  Discharge to Home  Patient to follow up with primary OB as outpatient  Patient left the unit in stable condition    Margret Everett, MARÍA-WHITNEY                  "

## 2024-09-25 ENCOUNTER — HOSPITAL ENCOUNTER (OUTPATIENT)
Dept: RADIOLOGY | Facility: HOSPITAL | Age: 24
Discharge: HOME | End: 2024-09-25

## 2024-09-25 DIAGNOSIS — Z32.01 PREGNANCY TEST POSITIVE (HHS-HCC): ICD-10-CM

## 2024-09-25 DIAGNOSIS — O36.5930 MATERNAL CARE FOR OTHER KNOWN OR SUSPECTED POOR FETAL GROWTH, THIRD TRIMESTER, NOT APPLICABLE OR UNSPECIFIED: ICD-10-CM

## 2024-09-25 PROCEDURE — 76816 OB US FOLLOW-UP PER FETUS: CPT

## 2024-09-25 PROCEDURE — 76820 UMBILICAL ARTERY ECHO: CPT | Performed by: STUDENT IN AN ORGANIZED HEALTH CARE EDUCATION/TRAINING PROGRAM

## 2024-09-25 PROCEDURE — 76819 FETAL BIOPHYS PROFIL W/O NST: CPT | Performed by: STUDENT IN AN ORGANIZED HEALTH CARE EDUCATION/TRAINING PROGRAM

## 2024-09-25 PROCEDURE — 76819 FETAL BIOPHYS PROFIL W/O NST: CPT

## 2024-09-25 PROCEDURE — 76816 OB US FOLLOW-UP PER FETUS: CPT | Performed by: STUDENT IN AN ORGANIZED HEALTH CARE EDUCATION/TRAINING PROGRAM

## 2024-09-25 PROCEDURE — 76820 UMBILICAL ARTERY ECHO: CPT

## 2024-09-27 ENCOUNTER — DOCUMENTATION (OUTPATIENT)
Dept: OBSTETRICS AND GYNECOLOGY | Facility: HOSPITAL | Age: 24
End: 2024-09-27

## 2024-09-27 ENCOUNTER — ROUTINE PRENATAL (OUTPATIENT)
Dept: OBSTETRICS AND GYNECOLOGY | Facility: HOSPITAL | Age: 24
End: 2024-09-27

## 2024-09-27 VITALS — WEIGHT: 258.8 LBS | DIASTOLIC BLOOD PRESSURE: 77 MMHG | SYSTOLIC BLOOD PRESSURE: 124 MMHG | BODY MASS INDEX: 45.84 KG/M2

## 2024-09-27 DIAGNOSIS — Z3A.30 30 WEEKS GESTATION OF PREGNANCY (HHS-HCC): ICD-10-CM

## 2024-09-27 DIAGNOSIS — Z34.03 PRIMIGRAVIDA IN THIRD TRIMESTER (HHS-HCC): Primary | ICD-10-CM

## 2024-09-27 DIAGNOSIS — O99.012 ANEMIA DURING PREGNANCY IN SECOND TRIMESTER (HHS-HCC): ICD-10-CM

## 2024-09-27 DIAGNOSIS — O36.5990 FETAL GROWTH RESTRICTION ANTEPARTUM (HHS-HCC): ICD-10-CM

## 2024-09-27 DIAGNOSIS — Z28.21 TETANUS, DIPHTHERIA, AND ACELLULAR PERTUSSIS (TDAP) VACCINATION DECLINED: ICD-10-CM

## 2024-09-27 DIAGNOSIS — E66.9 OBESITY, CLASS II, BMI 35-39.9: ICD-10-CM

## 2024-09-27 PROBLEM — Z36.4 ULTRASOUND FOR ANTENATAL SCREENING FOR FETAL GROWTH RESTRICTION (HHS-HCC): Status: ACTIVE | Noted: 2024-09-27

## 2024-09-27 PROBLEM — Z36.4 ULTRASOUND FOR ANTENATAL SCREENING FOR FETAL GROWTH RESTRICTION (HHS-HCC): Status: RESOLVED | Noted: 2024-09-27 | Resolved: 2024-09-27

## 2024-09-27 PROBLEM — E66.812 OBESITY, CLASS II, BMI 35-39.9: Status: ACTIVE | Noted: 2024-09-27

## 2024-09-27 PROCEDURE — 0501F PRENATAL FLOW SHEET: CPT | Performed by: ADVANCED PRACTICE MIDWIFE

## 2024-09-27 NOTE — PROGRESS NOTES
Subjective     Dia Aaron is a 24 y.o.  at 30w2d with a working estimated date of delivery of 2024, by Last Menstrual Period who presents for a routine prenatal visit.     She denies vaginal bleeding, leakage of fluid, decreased fetal movements, or contractions.    Feeling well, denies complaints    Anemia- taking iron pills once daily every other day --> hgb 10.1 ferritin 15     Objective   Physical Exam:   Weight: 117 kg (258 lb 12.8 oz)  Expected Total Weight Gain: 5 kg (11 lb)-9 kg (19 lb)   Pregravid BMI: 44.30  BP: 124/77  Fetal Heart Rate: 140 Fundal Height (cm): 31 cm Presentation: Vertex           Postpartum Depression: Medium Risk (2024)    [place]  Depression Scale     Last EPDS Total Score: 7     Last EPDS Self Harm Result: Never        Problem List Items Addressed This Visit       Tetanus, diphtheria, and acellular pertussis (Tdap) vaccination declined    Primigravida in third trimester ([facility]) - Primary    Obesity, Class II, BMI 35-39.9    Overview     BMI = 44.30 at NOB  Fetal surveillance BMI 35-39.9 at NOB:  Growth US at 30 and 36 wks  BPP or NST weekly 37 wks to delivery    Fetal surveillance BMI >40 at NOB:  Growth US at 30 and 36 wks  BPP or NST weekly 34 wks to delivery    Timing of delivery: 39 0/7 - 39 6/7 wks  *BMI >= 50 at any time in pregnancy: Deliver at Level 4           Fetal growth restriction antepartum ([facility])    Overview     Diagnosed US --> Decreased interval fetal growth. The AC is at the 3% and the EFW is at the 8% percentile  -Normal amniotic fluid volume  -Normal doppler indices with the RI at the 78% percentile  -BPP /8    Weekly BPP/dopplers         Anemia during pregnancy in second trimester ([facility])    Overview     Hemoglobin 10.1 on   PO iron ordered  Repeat at 24-28 week labs--> hgb 10.1, ferritin 15 retic 1.4%         30 weeks gestation of pregnancy ([facility])     Patient counseled on the recommendation for and benefits of TDaP  vaccination during pregnancy.  We discussed the passive immunity that occurs after maternal vaccination during pregnancy, and the antibodies that cross the placenta to the fetus to protect baby in the first few months of life.  Babies do not receive their first vaccination for pertussis/whooping cough until 2 months of life, during which the baby is vulnerable to this bacterial infection.  Whooping cough can cause severe and even life-threatening infections, and maternal vaccination between 27 and 36 weeks of pregnancy is the best method to protect baby from Whooping cough till they are eligible for the vaccination. After discussion the patient declined the vaccine. >5 minutes were spent on counseling related to vaccine recommendations and safety.     Declines flu vaccine     Reviewed lab results 1 hr gtt WNL, still anemic. Discussed increasing iron intake to BID every other day, recheck in 2-3 weeks   Reviewed growth US and new diagnosis of FGR   Postpartum contraception options discussed, encouraged LARCs   surveillance weekly for FGR  Discussed breastfeeding - pump ordered   Reviewed s/sx of PTL, warning signs, fetal movement counts, and when to call provider  Follow up in 2 week for a routine prenatal visit.  Next visit: RSV vaccine/flu/Tdap     Zoë Vazquez, APRN-CNM, APRN-CNP

## 2024-10-03 ENCOUNTER — HOSPITAL ENCOUNTER (OUTPATIENT)
Dept: RADIOLOGY | Facility: HOSPITAL | Age: 24
Discharge: HOME | End: 2024-10-03

## 2024-10-03 DIAGNOSIS — Z32.01 PREGNANCY TEST POSITIVE (HHS-HCC): ICD-10-CM

## 2024-10-03 DIAGNOSIS — O99.213 OBESITY COMPLICATING PREGNANCY, THIRD TRIMESTER (HHS-HCC): ICD-10-CM

## 2024-10-03 DIAGNOSIS — O36.5930 MATERNAL CARE FOR OTHER KNOWN OR SUSPECTED POOR FETAL GROWTH, THIRD TRIMESTER, NOT APPLICABLE OR UNSPECIFIED: ICD-10-CM

## 2024-10-03 PROCEDURE — 76819 FETAL BIOPHYS PROFIL W/O NST: CPT

## 2024-10-03 PROCEDURE — 76815 OB US LIMITED FETUS(S): CPT

## 2024-10-03 PROCEDURE — 76820 UMBILICAL ARTERY ECHO: CPT

## 2024-10-08 ENCOUNTER — HOSPITAL ENCOUNTER (OUTPATIENT)
Dept: RADIOLOGY | Facility: HOSPITAL | Age: 24
Discharge: HOME | End: 2024-10-08

## 2024-10-08 ENCOUNTER — ROUTINE PRENATAL (OUTPATIENT)
Dept: OBSTETRICS AND GYNECOLOGY | Facility: HOSPITAL | Age: 24
End: 2024-10-08

## 2024-10-08 ENCOUNTER — LAB (OUTPATIENT)
Dept: LAB | Facility: LAB | Age: 24
End: 2024-10-08

## 2024-10-08 VITALS — WEIGHT: 259.2 LBS | BODY MASS INDEX: 45.92 KG/M2 | SYSTOLIC BLOOD PRESSURE: 132 MMHG | DIASTOLIC BLOOD PRESSURE: 83 MMHG

## 2024-10-08 DIAGNOSIS — O99.013 ANEMIA DURING PREGNANCY IN THIRD TRIMESTER (HHS-HCC): ICD-10-CM

## 2024-10-08 DIAGNOSIS — Z32.01 PREGNANCY TEST POSITIVE (HHS-HCC): ICD-10-CM

## 2024-10-08 DIAGNOSIS — Z3A.31 31 WEEKS GESTATION OF PREGNANCY (HHS-HCC): ICD-10-CM

## 2024-10-08 DIAGNOSIS — Z71.85 VACCINE COUNSELING: ICD-10-CM

## 2024-10-08 DIAGNOSIS — Z34.03 PRIMIGRAVIDA IN THIRD TRIMESTER (HHS-HCC): Primary | ICD-10-CM

## 2024-10-08 DIAGNOSIS — O36.5911 IUGR (INTRAUTERINE GROWTH RESTRICTION) AFFECTING CARE OF MOTHER, FIRST TRIMESTER, FETUS 1 (HHS-HCC): ICD-10-CM

## 2024-10-08 PROBLEM — R19.8: Status: RESOLVED | Noted: 2024-07-28 | Resolved: 2024-10-08

## 2024-10-08 PROBLEM — E66.01 OBESITY, CLASS III, BMI 40-49.9 (MORBID OBESITY) (MULTI): Status: ACTIVE | Noted: 2024-06-05

## 2024-10-08 PROBLEM — E66.9 OBESITY, UNSPECIFIED OBESITY SEVERITY, UNSPECIFIED OBESITY TYPE: Status: ACTIVE | Noted: 2024-06-05

## 2024-10-08 PROBLEM — F32.A DEPRESSION: Status: ACTIVE | Noted: 2024-10-08

## 2024-10-08 PROBLEM — E66.813 OBESITY, CLASS III, BMI 40-49.9 (MORBID OBESITY): Status: ACTIVE | Noted: 2024-09-27

## 2024-10-08 PROBLEM — E66.813 OBESITY, CLASS III, BMI 40-49.9 (MORBID OBESITY): Status: ACTIVE | Noted: 2024-06-05

## 2024-10-08 PROBLEM — R10.2 PAIN OF ROUND LIGAMENT DURING PREGNANCY (HHS-HCC): Status: RESOLVED | Noted: 2024-07-28 | Resolved: 2024-10-08

## 2024-10-08 PROBLEM — E66.01 OBESITY, CLASS III, BMI 40-49.9 (MORBID OBESITY) (MULTI): Status: ACTIVE | Noted: 2024-09-27

## 2024-10-08 PROBLEM — O26.892: Status: RESOLVED | Noted: 2024-07-28 | Resolved: 2024-10-08

## 2024-10-08 PROBLEM — O26.899 PAIN OF ROUND LIGAMENT DURING PREGNANCY (HHS-HCC): Status: RESOLVED | Noted: 2024-07-28 | Resolved: 2024-10-08

## 2024-10-08 LAB
ERYTHROCYTE [DISTWIDTH] IN BLOOD BY AUTOMATED COUNT: 14.2 % (ref 11.5–14.5)
FERRITIN SERPL-MCNC: 13 NG/ML
FOLATE SERPL-MCNC: 15.4 NG/ML
HCT VFR BLD AUTO: 31.4 % (ref 36–46)
HGB BLD-MCNC: 9.9 G/DL (ref 12–16)
IRON SATN MFR SERPL: 81 %
IRON SERPL-MCNC: 339 UG/DL
MCH RBC QN AUTO: 27.8 PG (ref 26–34)
MCHC RBC AUTO-ENTMCNC: 31.5 G/DL (ref 32–36)
MCV RBC AUTO: 88 FL (ref 80–100)
NRBC BLD-RTO: 0 /100 WBCS (ref 0–0)
PLATELET # BLD AUTO: 241 X10*3/UL (ref 150–450)
RBC # BLD AUTO: 3.56 X10*6/UL (ref 4–5.2)
REFLEX ADDED, ANEMIA PANEL: NORMAL
TIBC SERPL-MCNC: 420 UG/DL
UIBC SERPL-MCNC: 81 UG/DL
VIT B12 SERPL-MCNC: 281 PG/ML
WBC # BLD AUTO: 5.1 X10*3/UL (ref 4.4–11.3)

## 2024-10-08 PROCEDURE — 90678 RSV VACC PREF BIVALENT IM: CPT | Performed by: ADVANCED PRACTICE MIDWIFE

## 2024-10-08 PROCEDURE — 83550 IRON BINDING TEST: CPT

## 2024-10-08 PROCEDURE — 76820 UMBILICAL ARTERY ECHO: CPT

## 2024-10-08 PROCEDURE — 76820 UMBILICAL ARTERY ECHO: CPT | Performed by: STUDENT IN AN ORGANIZED HEALTH CARE EDUCATION/TRAINING PROGRAM

## 2024-10-08 PROCEDURE — 85027 COMPLETE CBC AUTOMATED: CPT

## 2024-10-08 PROCEDURE — 82728 ASSAY OF FERRITIN: CPT

## 2024-10-08 PROCEDURE — 76815 OB US LIMITED FETUS(S): CPT

## 2024-10-08 PROCEDURE — 0501F PRENATAL FLOW SHEET: CPT | Performed by: ADVANCED PRACTICE MIDWIFE

## 2024-10-08 PROCEDURE — 76819 FETAL BIOPHYS PROFIL W/O NST: CPT | Performed by: STUDENT IN AN ORGANIZED HEALTH CARE EDUCATION/TRAINING PROGRAM

## 2024-10-08 PROCEDURE — 36415 COLL VENOUS BLD VENIPUNCTURE: CPT

## 2024-10-08 PROCEDURE — 82746 ASSAY OF FOLIC ACID SERUM: CPT

## 2024-10-08 PROCEDURE — 82607 VITAMIN B-12: CPT

## 2024-10-08 PROCEDURE — 76819 FETAL BIOPHYS PROFIL W/O NST: CPT

## 2024-10-08 PROCEDURE — 76815 OB US LIMITED FETUS(S): CPT | Performed by: STUDENT IN AN ORGANIZED HEALTH CARE EDUCATION/TRAINING PROGRAM

## 2024-10-08 NOTE — PROGRESS NOTES
Subjective   Dia Aaron is a 24 y.o.  at 31w6d with a working estimated date of delivery of 2024, by Last Menstrual Period who presents for a routine prenatal visit.     She denies vaginal bleeding, leakage of fluid, decreased fetal movements, or contractions.    Objective   Physical Exam:   Weight: 118 kg (259 lb 3.2 oz)  TW.173 kg (9 lb 3.2 oz)  BP: 132/83  Fetal Heart Rate: 145 Fundal Height (cm): 32 cm           Postpartum Depression: Medium Risk (2024)    [place]  Depression Scale     Last EPDS Total Score: 7     Last EPDS Self Harm Result: Never      Prenatal Labs  Lab Results   Component Value Date    HGB 10.1 (L) 2024    HCT 31.0 (L) 2024     2024    SYPHT Nonreactive 2024     Lab Results   Component Value Date    GLUC1P 95 2024     Imaging  The most recent growth ultrasound was performed on 24 with a study GA of 30w0d and EFW 1270g 8%, AC 3%.     Assessment/Plan   Problem List Items Addressed This Visit          Pregnancy    Anemia during pregnancy in second trimester ([facility])    Overview     Hemoglobin 10.1 on   PO iron ordered  Repeat at 24-28 week labs--> hgb 10.1, ferritin 15 retic 1.4%    [] CBC ordered 10/08/24          31 weeks gestation of pregnancy ([facility])    Overview     Desired provider in labor: [x] CNM  [] Physician  [x] Initial BMI: 44.30  [x] Prenatal Labs:   [x] Rh status: +  [] Genetic Screening:    [x] Baby ASA  [x] Dating confirmation with US    [x] Anatomy US: WNL   [] Patient added to BirthTracks  [x] 1hr GCT at 24-28wks:   [] [] [x] Fetal Surveillance (if indicated): yes, in s/o FGR, 8%ILE    [] Tdap (27-36wks): counseled  [x] RSV (32-36wks) given 10/08/24   [] Flu Shot: counseled  [] COVID vaccine:     [x] Breastfeeding: yes, waiting for pump from insurance  [] Pain management during labor:   [] Postpartum Birth control method:     [] GBS at 36 wks:  [] 39 weeks discussion of IOL vs. Expectant  management:  [] Mode of delivery:            Relevant Orders    Respiratory Syncytial Virus (RSV), Eligible Pregnant Pts, 0.5 mL (ABRYSVO)    Primigravida in third trimester ([facility]) - Primary    Relevant Orders    Respiratory Syncytial Virus (RSV), Eligible Pregnant Pts, 0.5 mL (ABRYSVO)     Other Visit Diagnoses       Vaccine counseling        Relevant Orders    Respiratory Syncytial Virus (RSV), Eligible Pregnant Pts, 0.5 mL (ABRYSVO)          Reviewed lab results , needs repeat CBC, continue po iron  Reviewed growth US FGR 8%ile   surveillance weekly for FGR  Reviewed s/sx of PTL, warning signs, fetal movement counts, and when to call provider  Follow up in 2 weeks for a routine prenatal visit.    Christina Casas, APRN-CNM, APRN-CNP

## 2024-10-09 ENCOUNTER — APPOINTMENT (OUTPATIENT)
Dept: RADIOLOGY | Facility: HOSPITAL | Age: 24
End: 2024-10-09

## 2024-10-16 ENCOUNTER — HOSPITAL ENCOUNTER (OUTPATIENT)
Dept: RADIOLOGY | Facility: HOSPITAL | Age: 24
Discharge: HOME | End: 2024-10-16

## 2024-10-16 DIAGNOSIS — Z32.01 PREGNANCY TEST POSITIVE (HHS-HCC): ICD-10-CM

## 2024-10-16 PROCEDURE — 76816 OB US FOLLOW-UP PER FETUS: CPT

## 2024-10-16 PROCEDURE — 76819 FETAL BIOPHYS PROFIL W/O NST: CPT

## 2024-10-16 PROCEDURE — 76820 UMBILICAL ARTERY ECHO: CPT

## 2024-10-23 ENCOUNTER — HOSPITAL ENCOUNTER (OUTPATIENT)
Dept: RADIOLOGY | Facility: HOSPITAL | Age: 24
Discharge: HOME | End: 2024-10-23

## 2024-10-23 ENCOUNTER — ROUTINE PRENATAL (OUTPATIENT)
Dept: OBSTETRICS AND GYNECOLOGY | Facility: HOSPITAL | Age: 24
End: 2024-10-23

## 2024-10-23 VITALS — SYSTOLIC BLOOD PRESSURE: 124 MMHG | DIASTOLIC BLOOD PRESSURE: 78 MMHG | BODY MASS INDEX: 45.7 KG/M2 | WEIGHT: 258 LBS

## 2024-10-23 DIAGNOSIS — O99.343 DEPRESSION DURING PREGNANCY IN THIRD TRIMESTER (HHS-HCC): ICD-10-CM

## 2024-10-23 DIAGNOSIS — E66.01 OBESITY, CLASS III, BMI 40-49.9 (MORBID OBESITY) (MULTI): ICD-10-CM

## 2024-10-23 DIAGNOSIS — O36.5990 IUGR (INTRAUTERINE GROWTH RESTRICTION) AFFECTING CARE OF MOTHER (HHS-HCC): ICD-10-CM

## 2024-10-23 DIAGNOSIS — O36.5990 FETAL GROWTH RESTRICTION ANTEPARTUM (HHS-HCC): ICD-10-CM

## 2024-10-23 DIAGNOSIS — Z3A.34 34 WEEKS GESTATION OF PREGNANCY (HHS-HCC): Primary | ICD-10-CM

## 2024-10-23 DIAGNOSIS — F32.A DEPRESSION DURING PREGNANCY IN THIRD TRIMESTER (HHS-HCC): ICD-10-CM

## 2024-10-23 DIAGNOSIS — O99.012 ANEMIA DURING PREGNANCY IN SECOND TRIMESTER (HHS-HCC): ICD-10-CM

## 2024-10-23 DIAGNOSIS — Z32.01 PREGNANCY TEST POSITIVE (HHS-HCC): ICD-10-CM

## 2024-10-23 PROBLEM — Z34.03 PRIMIGRAVIDA IN THIRD TRIMESTER (HHS-HCC): Status: RESOLVED | Noted: 2024-07-28 | Resolved: 2024-10-23

## 2024-10-23 PROCEDURE — 76815 OB US LIMITED FETUS(S): CPT

## 2024-10-23 PROCEDURE — 76820 UMBILICAL ARTERY ECHO: CPT

## 2024-10-23 PROCEDURE — 76819 FETAL BIOPHYS PROFIL W/O NST: CPT | Performed by: STUDENT IN AN ORGANIZED HEALTH CARE EDUCATION/TRAINING PROGRAM

## 2024-10-23 PROCEDURE — 76819 FETAL BIOPHYS PROFIL W/O NST: CPT

## 2024-10-23 PROCEDURE — 76820 UMBILICAL ARTERY ECHO: CPT | Performed by: STUDENT IN AN ORGANIZED HEALTH CARE EDUCATION/TRAINING PROGRAM

## 2024-10-23 NOTE — PROGRESS NOTES
Ob Visit  10/23/24     SUBJECTIVE      HPI: Dia Aaron is a 24 y.o.  at 34w0d here for RPNV.  She has has occasional [place] contractions, denies bleeding, or LOF. Reports normal fetal movement. Patient reports no concerns.       OBJECTIVE  Visit Vitals  /78   Wt 117 kg (258 lb)   LMP 2024   BMI 45.70 kg/m²   OB Status Pregnant   Smoking Status Never   BSA 2.28 m²            ASSESSMENT & PLAN    Dia Aaron is a 24 y.o.  at 34w0d here for the following concerns we addressed today:    Problem List Items Addressed This Visit       Obesity, Class III, BMI 40-49.9 (morbid obesity) (Multi)    Overview     Pre-pregnancy BMI 44  Growth 30, 36 weeks  Weekly  testing beginning at 34 weeks    Timing of delivery: 39 0/7 - 39 6/7 wks  *BMI >= 50 at any time in pregnancy: Deliver at Level 4    31w6d Body mass index is 45.92 kg/m².            Anemia during pregnancy in second trimester ([facility])    Overview     Hemoglobin 10.1 on   PO iron ordered  Repeat at 24-28 week labs--> hgb 10.1, ferritin 15 retic 1.4%    [x] CBC ordered 10/08/24   -hgb 9.9  -ferritin 13  Plan to continue po iron twice daily  [] Recheck CBC at 36wk         34 weeks gestation of pregnancy ([facility]) - Primary    Overview     Desired provider in labor: [x] CNM  [] Physician  [x] Initial BMI: 44.30  [x] Prenatal Labs:   [x] Rh status: +  [] Genetic Screening:    [x] Baby ASA  [x] Dating confirmation with US    [x] Anatomy US: WNL   [] Patient added to BirthTracks  [x] 1hr GCT at 24-28wks:   [] [] [x] Fetal Surveillance (if indicated): yes, in s/o FGR, 8%ILE    [x] Tdap (27-36wks): declined  [x] RSV (32-36wks) given 10/08/24   [x] Flu Shot: declined  [] COVID vaccine:     [x] Breastfeeding: yes, waiting for pump from insurance  [] Pain management during labor:   [] Postpartum Birth control method:     [] GBS at 36 wks:  [] 39 weeks discussion of IOL vs. Expectant management:  [x] Mode of delivery: vaginal            Fetal growth restriction antepartum ([facility])    Overview     Diagnosed US 9/25--> Decreased interval fetal growth. The AC is at the 3% and the EFW is at the 8% percentile  -Normal amniotic fluid volume  -Normal doppler indices with the RI at the 78% percentile  -BPP 8/8    [facility]  Weekly BPP/dopplers         Depression         RTC in 2 weeks      Nathalie Dhaliwal, MARÍA-MAGDALENAM

## 2024-10-29 ENCOUNTER — HOSPITAL ENCOUNTER (OUTPATIENT)
Dept: RADIOLOGY | Facility: HOSPITAL | Age: 24
Discharge: HOME | End: 2024-10-29

## 2024-10-29 DIAGNOSIS — Z32.01 PREGNANCY TEST POSITIVE (HHS-HCC): ICD-10-CM

## 2024-10-29 PROCEDURE — 76820 UMBILICAL ARTERY ECHO: CPT | Performed by: STUDENT IN AN ORGANIZED HEALTH CARE EDUCATION/TRAINING PROGRAM

## 2024-10-29 PROCEDURE — 76820 UMBILICAL ARTERY ECHO: CPT

## 2024-10-29 PROCEDURE — 76819 FETAL BIOPHYS PROFIL W/O NST: CPT | Performed by: STUDENT IN AN ORGANIZED HEALTH CARE EDUCATION/TRAINING PROGRAM

## 2024-10-29 PROCEDURE — 76819 FETAL BIOPHYS PROFIL W/O NST: CPT

## 2024-10-29 PROCEDURE — 76815 OB US LIMITED FETUS(S): CPT

## 2024-10-29 PROCEDURE — 76815 OB US LIMITED FETUS(S): CPT | Performed by: STUDENT IN AN ORGANIZED HEALTH CARE EDUCATION/TRAINING PROGRAM

## 2024-11-05 ENCOUNTER — APPOINTMENT (OUTPATIENT)
Dept: OBSTETRICS AND GYNECOLOGY | Facility: HOSPITAL | Age: 24
End: 2024-11-05

## 2024-11-07 ENCOUNTER — HOSPITAL ENCOUNTER (OUTPATIENT)
Dept: RADIOLOGY | Facility: HOSPITAL | Age: 24
Discharge: HOME | End: 2024-11-07

## 2024-11-07 DIAGNOSIS — O36.5990 IUGR (INTRAUTERINE GROWTH RESTRICTION) AFFECTING CARE OF MOTHER, UNSPECIFIED TRIMESTER, NOT APPLICABLE OR UNSPECIFIED FETUS (HHS-HCC): ICD-10-CM

## 2024-11-07 DIAGNOSIS — O99.210 OBESITY AFFECTING PREGNANCY, ANTEPARTUM, UNSPECIFIED TRIMESTER (HHS-HCC): ICD-10-CM

## 2024-11-07 DIAGNOSIS — Z32.01 PREGNANCY TEST POSITIVE (HHS-HCC): ICD-10-CM

## 2024-11-07 PROCEDURE — 76820 UMBILICAL ARTERY ECHO: CPT

## 2024-11-07 PROCEDURE — 76819 FETAL BIOPHYS PROFIL W/O NST: CPT

## 2024-11-07 PROCEDURE — 76816 OB US FOLLOW-UP PER FETUS: CPT

## 2024-11-13 ENCOUNTER — HOSPITAL ENCOUNTER (OUTPATIENT)
Dept: RADIOLOGY | Facility: HOSPITAL | Age: 24
Discharge: HOME | End: 2024-11-13

## 2024-11-13 ENCOUNTER — ROUTINE PRENATAL (OUTPATIENT)
Dept: OBSTETRICS AND GYNECOLOGY | Facility: HOSPITAL | Age: 24
End: 2024-11-13

## 2024-11-13 VITALS — DIASTOLIC BLOOD PRESSURE: 74 MMHG | BODY MASS INDEX: 45.53 KG/M2 | SYSTOLIC BLOOD PRESSURE: 114 MMHG | WEIGHT: 257 LBS

## 2024-11-13 DIAGNOSIS — E66.01 OBESITY, CLASS III, BMI 40-49.9 (MORBID OBESITY) (MULTI): ICD-10-CM

## 2024-11-13 DIAGNOSIS — O99.343 DEPRESSION DURING PREGNANCY IN THIRD TRIMESTER (HHS-HCC): ICD-10-CM

## 2024-11-13 DIAGNOSIS — O36.5990 FETAL GROWTH RESTRICTION ANTEPARTUM (HHS-HCC): ICD-10-CM

## 2024-11-13 DIAGNOSIS — Z32.01 PREGNANCY TEST POSITIVE (HHS-HCC): ICD-10-CM

## 2024-11-13 DIAGNOSIS — F32.A DEPRESSION DURING PREGNANCY IN THIRD TRIMESTER (HHS-HCC): ICD-10-CM

## 2024-11-13 DIAGNOSIS — O99.012 ANEMIA DURING PREGNANCY IN SECOND TRIMESTER (HHS-HCC): ICD-10-CM

## 2024-11-13 DIAGNOSIS — Z3A.37 37 WEEKS GESTATION OF PREGNANCY (HHS-HCC): Primary | ICD-10-CM

## 2024-11-13 PROCEDURE — 76819 FETAL BIOPHYS PROFIL W/O NST: CPT

## 2024-11-13 PROCEDURE — 76820 UMBILICAL ARTERY ECHO: CPT

## 2024-11-13 PROCEDURE — 87081 CULTURE SCREEN ONLY: CPT

## 2024-11-13 PROCEDURE — 76820 UMBILICAL ARTERY ECHO: CPT | Performed by: MEDICAL GENETICS

## 2024-11-13 PROCEDURE — 76815 OB US LIMITED FETUS(S): CPT | Performed by: MEDICAL GENETICS

## 2024-11-13 PROCEDURE — 76819 FETAL BIOPHYS PROFIL W/O NST: CPT | Performed by: MEDICAL GENETICS

## 2024-11-13 PROCEDURE — 76815 OB US LIMITED FETUS(S): CPT

## 2024-11-13 NOTE — PROGRESS NOTES
Ob Visit  24     SUBJECTIVE      HPI: Dia Aaron is a 24 y.o.  at 37w0d here for RPNV.  She denies contractions, bleeding, or LOF. Reports normal fetal movement. Patient reports no concerns       OBJECTIVE  Visit Vitals  /74   Wt 117 kg (257 lb)   LMP 2024   BMI 45.53 kg/m²   OB Status Pregnant   Smoking Status Never   BSA 2.28 m²            ASSESSMENT & PLAN    Dia Aaron is a 24 y.o.  at 37w0d here for the following concerns we addressed today:    Problem List Items Addressed This Visit       Obesity, Class III, BMI 40-49.9 (morbid obesity) (Multi)    Overview     Pre-pregnancy BMI 44  Growth 30, 36 weeks  Weekly  testing beginning at 34 weeks    Timing of delivery: 39 0/7 - 39 6/7 wks  *BMI >= 50 at any time in pregnancy: Deliver at Level 4    31w6d Body mass index is 45.92 kg/m².            Anemia during pregnancy in second trimester ([facility])    Overview     Hemoglobin 10.1 on   PO iron ordered  Repeat at 24-28 week labs--> hgb 10.1, ferritin 15 retic 1.4%    [x] CBC ordered 10/08/24   -hgb 9.9  -ferritin 13  Plan to continue po iron twice daily  [] Recheck CBC at 36wk         37 weeks gestation of pregnancy ([facility]) - Primary    Overview     Desired provider in labor: [x] CNM  [] Physician  [x] Initial BMI: 44.30  [x] Prenatal Labs:   [x] Rh status: +  [] Genetic Screening:    [x] Baby ASA  [x] Dating confirmation with US    [x] Anatomy US: WNL   [] Patient added to BirthTracks  [x] 1hr GCT at 24-28wks:   [] [] [x] Fetal Surveillance (if indicated): yes, in s/o FGR, 8%ILE    [x] Tdap (27-36wks): declined  [x] RSV (32-36wks) given 10/08/24   [x] Flu Shot: declined  [] COVID vaccine:     [x] Breastfeeding: yes, waiting for pump from insurance  [] Pain management during labor:   [] Postpartum Birth control method:     [x] GBS at 36 wks:  [] 39 weeks discussion of IOL vs. Expectant management:  [x] Mode of delivery: vaginal           Relevant Orders    Group B  Streptococcus (GBS) Prenatal Screen, Culture    Fetal growth restriction antepartum ([facility])    Overview     Diagnosed US 9/25--> Decreased interval fetal growth. The AC is at the 3% and the EFW is at the 8% percentile  -Normal amniotic fluid volume  -Normal doppler indices with the RI at the 78% percentile  -BPP 8/8    Reviewed at OB[facility] 10/4  Weekly BPP/dopplers    11/7  AC 3%, EFW 12%         Depression         RTC in 1 week      MARÍA Nuñez-MAGDALENAM

## 2024-11-16 LAB — GP B STREP GENITAL QL CULT: NORMAL

## 2024-11-20 ENCOUNTER — ROUTINE PRENATAL (OUTPATIENT)
Dept: OBSTETRICS AND GYNECOLOGY | Facility: HOSPITAL | Age: 24
End: 2024-11-20

## 2024-11-20 ENCOUNTER — TELEPHONE (OUTPATIENT)
Dept: OBSTETRICS AND GYNECOLOGY | Facility: HOSPITAL | Age: 24
End: 2024-11-20

## 2024-11-20 ENCOUNTER — HOSPITAL ENCOUNTER (OUTPATIENT)
Dept: RADIOLOGY | Facility: HOSPITAL | Age: 24
Discharge: HOME | End: 2024-11-20

## 2024-11-20 VITALS — BODY MASS INDEX: 45.88 KG/M2 | SYSTOLIC BLOOD PRESSURE: 123 MMHG | DIASTOLIC BLOOD PRESSURE: 79 MMHG | WEIGHT: 259 LBS

## 2024-11-20 DIAGNOSIS — O99.343 DEPRESSION DURING PREGNANCY IN THIRD TRIMESTER (HHS-HCC): ICD-10-CM

## 2024-11-20 DIAGNOSIS — O36.5990 FETAL GROWTH RESTRICTION ANTEPARTUM (HHS-HCC): Primary | ICD-10-CM

## 2024-11-20 DIAGNOSIS — Z3A.38 38 WEEKS GESTATION OF PREGNANCY (HHS-HCC): Primary | ICD-10-CM

## 2024-11-20 DIAGNOSIS — O36.5990 FETAL GROWTH RESTRICTION ANTEPARTUM (HHS-HCC): ICD-10-CM

## 2024-11-20 DIAGNOSIS — F32.A DEPRESSION DURING PREGNANCY IN THIRD TRIMESTER (HHS-HCC): ICD-10-CM

## 2024-11-20 DIAGNOSIS — E66.01 OBESITY, CLASS III, BMI 40-49.9 (MORBID OBESITY) (MULTI): ICD-10-CM

## 2024-11-20 DIAGNOSIS — O99.012 ANEMIA DURING PREGNANCY IN SECOND TRIMESTER (HHS-HCC): ICD-10-CM

## 2024-11-20 DIAGNOSIS — Z32.01 PREGNANCY TEST POSITIVE (HHS-HCC): ICD-10-CM

## 2024-11-20 PROCEDURE — 0501F PRENATAL FLOW SHEET: CPT

## 2024-11-20 PROCEDURE — 76820 UMBILICAL ARTERY ECHO: CPT

## 2024-11-20 PROCEDURE — 76818 FETAL BIOPHYS PROFILE W/NST: CPT

## 2024-11-20 NOTE — TELEPHONE ENCOUNTER
----- Message from Nathalie Dhaliwal sent at 11/20/2024  2:54 PM EST -----  Regarding: IOL for FGR  I looked at the guidelines, and due to FGR and class III obesity, an IOL should be scheduled for her.    If it could be made for 39.0 weeks give or take 1 day, that would be great.    She also had severe anemia and she was started on PO iron.  I put in the order for her to go to the lab and complete a CBC ASAP so we can potentially squeeze her in for an IV iron infusion if necessary prior to IOL.    Thanks!    Nathalie Dhaliwal, APRCATALINA-CNM    Patient scheduled for medically indicated IOL at 38.5 wga on 11/25 at 8pm for FGR  Patient aware of date and time to arrive to L&D  Induction letter sent to patient via [employer]  Labor induction order pended to provider for signing  Encouraged patient to call office with any questions or concerns  Lila Cox RN

## 2024-11-20 NOTE — TELEPHONE ENCOUNTER
Contacted patient to discuss scheduling IOL between 38-39 weeks  Offered patient tomorrow or Friday-she declined. Would like to discuss with mother and call office back to let us know what date she prefers  Lila Cox RN

## 2024-11-20 NOTE — PROGRESS NOTES
Ob Visit  24     SUBJECTIVE      HPI: Dia Aaron is a 24 y.o.  at 38w0d here for RPNV.  She has rare contractions, denies bleeding, or LOF. Reports normal fetal movement. Patient reports concerns regarding IOL in the setting of FGR and class III obesity       OBJECTIVE  Visit Vitals  /79   Wt 117 kg (259 lb)   LMP 2024   BMI 45.88 kg/m²   OB Status Pregnant   Smoking Status Never   BSA 2.28 m²            ASSESSMENT & PLAN    Dia Aaron is a 24 y.o.  at 38w0d here for the following concerns we addressed today:    Problem List Items Addressed This Visit       Obesity, Class III, BMI 40-49.9 (morbid obesity) (Multi)    Overview     Pre-pregnancy BMI 44  Growth 30, 36 weeks  Weekly  testing beginning at 34 weeks    Timing of delivery: 39 0/7 - 39 6/7 wks  *BMI >= 50 at any time in pregnancy: Deliver at Level 4    31w6d Body mass index is 45.92 kg/m².            Anemia during pregnancy in second trimester ([facility])    Overview     Hemoglobin 10.1 on   PO iron ordered  Repeat at 24-28 week labs--> hgb 10.1, ferritin 15 retic 1.4%    [x] CBC ordered 10/08/24   -hgb 9.9  -ferritin 13         Relevant Orders    CBC Anemia Panel With Reflex,Pregnancy    Reticulocytes    38 weeks gestation of pregnancy ([facility]) - Primary    Overview     Desired provider in labor: [x] CNM  [] Physician  [x] Initial BMI: 44.30  [x] Prenatal Labs:   [x] Rh status: +  [] Genetic Screening:    [x] Baby ASA  [x] Dating confirmation with US    [x] Anatomy US: WNL   [] Patient added to BirthTracks  [x] 1hr GCT at 24-28wks: 95  [] [] [x] Fetal Surveillance (if indicated): yes, in s/o FGR, 8%ILE    [x] Tdap (27-36wks): declined  [x] RSV (32-36wks) given 10/08/24   [x] Flu Shot: declined  [] COVID vaccine:     [x] Breastfeeding: yes, waiting for pump from insurance  [] Pain management during labor:   [] Postpartum Birth control method:     [x] GBS at 36 wks:  [x] 39 weeks discussion of IOL vs. Expectant  management: IOL for FGR  [x] Mode of delivery: vaginal           Fetal growth restriction antepartum ([facility])    Overview     Diagnosed US 9/25--> Decreased interval fetal growth. The AC is at the 3% and the EFW is at the 8% percentile  -Normal amniotic fluid volume  -Normal doppler indices with the RI at the 78% percentile  -BPP 8/8    Reviewed at OB[facility] 10/4  Weekly BPP/dopplers    11/7  AC 3%, EFW 12%         Depression         RTC in 1 week      MARÍA Nuñez-WHITNEY

## 2024-11-20 NOTE — TELEPHONE ENCOUNTER
----- Message from Nathalie Dhaliwal sent at 11/20/2024  2:54 PM EST -----  Regarding: IOL for FGR  I looked at the guidelines, and due to FGR and class III obesity, an IOL should be scheduled for her.    If it could be made for 39.0 weeks give or take 1 day, that would be great.    She also had severe anemia and she was started on PO iron.  I put in the order for her to go to the lab and complete a CBC ASAP so we can potentially squeeze her in for an IV iron infusion if necessary prior to IOL.    Thanks!    Nathalie Dhaliwal, APRN-CNM

## 2024-11-24 ENCOUNTER — HOSPITAL ENCOUNTER (EMERGENCY)
Facility: HOSPITAL | Age: 24
Discharge: STILL ADMITTED OR ADMINISTRATIVE | End: 2024-11-24

## 2024-11-24 ENCOUNTER — HOSPITAL ENCOUNTER (OUTPATIENT)
Facility: HOSPITAL | Age: 24
Discharge: HOME | End: 2024-11-24
Attending: OBSTETRICS & GYNECOLOGY | Admitting: OBSTETRICS & GYNECOLOGY

## 2024-11-24 VITALS
WEIGHT: 263.78 LBS | OXYGEN SATURATION: 98 % | RESPIRATION RATE: 18 BRPM | SYSTOLIC BLOOD PRESSURE: 118 MMHG | BODY MASS INDEX: 46.74 KG/M2 | HEART RATE: 94 BPM | DIASTOLIC BLOOD PRESSURE: 67 MMHG | HEIGHT: 63 IN | TEMPERATURE: 98.1 F

## 2024-11-24 PROCEDURE — 99214 OFFICE O/P EST MOD 30 MIN: CPT | Mod: 25

## 2024-11-24 PROCEDURE — 99214 OFFICE O/P EST MOD 30 MIN: CPT | Performed by: OBSTETRICS & GYNECOLOGY

## 2024-11-24 PROCEDURE — 2500000001 HC RX 250 WO HCPCS SELF ADMINISTERED DRUGS (ALT 637 FOR MEDICARE OP): Performed by: OBSTETRICS & GYNECOLOGY

## 2024-11-24 PROCEDURE — 59025 FETAL NON-STRESS TEST: CPT | Performed by: OBSTETRICS & GYNECOLOGY

## 2024-11-24 PROCEDURE — 4500999001 HC ED NO CHARGE

## 2024-11-24 RX ORDER — ONDANSETRON HYDROCHLORIDE 2 MG/ML
4 INJECTION, SOLUTION INTRAVENOUS EVERY 6 HOURS PRN
Status: DISCONTINUED | OUTPATIENT
Start: 2024-11-24 | End: 2024-11-24 | Stop reason: HOSPADM

## 2024-11-24 RX ORDER — LIDOCAINE HYDROCHLORIDE 10 MG/ML
0.5 INJECTION, SOLUTION EPIDURAL; INFILTRATION; INTRACAUDAL; PERINEURAL ONCE AS NEEDED
Status: DISCONTINUED | OUTPATIENT
Start: 2024-11-24 | End: 2024-11-24 | Stop reason: HOSPADM

## 2024-11-24 RX ORDER — ACETAMINOPHEN 325 MG/1
975 TABLET ORAL ONCE
Status: COMPLETED | OUTPATIENT
Start: 2024-11-24 | End: 2024-11-24

## 2024-11-24 RX ORDER — HYDRALAZINE HYDROCHLORIDE 20 MG/ML
5 INJECTION INTRAMUSCULAR; INTRAVENOUS ONCE AS NEEDED
Status: DISCONTINUED | OUTPATIENT
Start: 2024-11-24 | End: 2024-11-24 | Stop reason: HOSPADM

## 2024-11-24 RX ORDER — ONDANSETRON 4 MG/1
4 TABLET, FILM COATED ORAL EVERY 6 HOURS PRN
Status: DISCONTINUED | OUTPATIENT
Start: 2024-11-24 | End: 2024-11-24 | Stop reason: HOSPADM

## 2024-11-24 RX ORDER — NIFEDIPINE 10 MG/1
10 CAPSULE ORAL ONCE AS NEEDED
Status: DISCONTINUED | OUTPATIENT
Start: 2024-11-24 | End: 2024-11-24 | Stop reason: HOSPADM

## 2024-11-24 RX ORDER — LABETALOL HYDROCHLORIDE 5 MG/ML
20 INJECTION, SOLUTION INTRAVENOUS ONCE AS NEEDED
Status: DISCONTINUED | OUTPATIENT
Start: 2024-11-24 | End: 2024-11-24 | Stop reason: HOSPADM

## 2024-11-24 RX ADMIN — ACETAMINOPHEN 975 MG: 325 TABLET ORAL at 03:11

## 2024-11-24 SDOH — ECONOMIC STABILITY: HOUSING INSECURITY: DO YOU FEEL UNSAFE GOING BACK TO THE PLACE WHERE YOU ARE LIVING?: NO

## 2024-11-24 SDOH — HEALTH STABILITY: MENTAL HEALTH: HAVE YOU USED ANY SUBSTANCES (CANABIS, COCAINE, HEROIN, HALLUCINOGENS, INHALANTS, ETC.) IN THE PAST 12 MONTHS?: NO

## 2024-11-24 SDOH — HEALTH STABILITY: MENTAL HEALTH: NON-SPECIFIC ACTIVE SUICIDAL THOUGHTS (PAST 1 MONTH): NO

## 2024-11-24 SDOH — SOCIAL STABILITY: SOCIAL INSECURITY: ABUSE SCREEN: ADULT

## 2024-11-24 SDOH — SOCIAL STABILITY: SOCIAL INSECURITY: ARE YOU OR HAVE YOU BEEN THREATENED OR ABUSED PHYSICALLY, EMOTIONALLY, OR SEXUALLY BY ANYONE?: NO

## 2024-11-24 SDOH — SOCIAL STABILITY: SOCIAL INSECURITY: PHYSICAL ABUSE: DENIES

## 2024-11-24 SDOH — SOCIAL STABILITY: SOCIAL INSECURITY: HAVE YOU HAD THOUGHTS OF HARMING ANYONE ELSE?: NO

## 2024-11-24 SDOH — SOCIAL STABILITY: SOCIAL INSECURITY: DO YOU FEEL ANYONE HAS EXPLOITED OR TAKEN ADVANTAGE OF YOU FINANCIALLY OR OF YOUR PERSONAL PROPERTY?: NO

## 2024-11-24 SDOH — SOCIAL STABILITY: SOCIAL INSECURITY: VERBAL ABUSE: DENIES

## 2024-11-24 SDOH — HEALTH STABILITY: MENTAL HEALTH: HAVE YOU USED ANY PRESCRIPTION DRUGS OTHER THAN PRESCRIBED IN THE PAST 12 MONTHS?: NO

## 2024-11-24 SDOH — SOCIAL STABILITY: SOCIAL INSECURITY: ARE THERE ANY APPARENT SIGNS OF INJURIES/BEHAVIORS THAT COULD BE RELATED TO ABUSE/NEGLECT?: NO

## 2024-11-24 SDOH — SOCIAL STABILITY: SOCIAL INSECURITY: HAVE YOU HAD ANY THOUGHTS OF HARMING ANYONE ELSE?: NO

## 2024-11-24 SDOH — SOCIAL STABILITY: SOCIAL INSECURITY: DOES ANYONE TRY TO KEEP YOU FROM HAVING/CONTACTING OTHER FRIENDS OR DOING THINGS OUTSIDE YOUR HOME?: NO

## 2024-11-24 SDOH — HEALTH STABILITY: MENTAL HEALTH: WERE YOU ABLE TO COMPLETE ALL THE BEHAVIORAL HEALTH SCREENINGS?: YES

## 2024-11-24 SDOH — SOCIAL STABILITY: SOCIAL INSECURITY: HAS ANYONE EVER THREATENED TO HURT YOUR FAMILY OR YOUR PETS?: NO

## 2024-11-24 SDOH — HEALTH STABILITY: MENTAL HEALTH: SUICIDAL BEHAVIOR (LIFETIME): NO

## 2024-11-24 SDOH — HEALTH STABILITY: MENTAL HEALTH: WISH TO BE DEAD (PAST 1 MONTH): NO

## 2024-11-24 ASSESSMENT — LIFESTYLE VARIABLES
AUDIT-C TOTAL SCORE: 0
SKIP TO QUESTIONS 9-10: 1
HOW OFTEN DO YOU HAVE 6 OR MORE DRINKS ON ONE OCCASION: NEVER
HOW MANY STANDARD DRINKS CONTAINING ALCOHOL DO YOU HAVE ON A TYPICAL DAY: PATIENT DOES NOT DRINK
HOW OFTEN DO YOU HAVE A DRINK CONTAINING ALCOHOL: NEVER
AUDIT-C TOTAL SCORE: 0

## 2024-11-24 ASSESSMENT — PAIN SCALES - GENERAL: PAINLEVEL_OUTOF10: 7
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
## 2024-11-25 ENCOUNTER — HOSPITAL ENCOUNTER (INPATIENT)
Facility: HOSPITAL | Age: 24
LOS: 3 days | Discharge: HOME | End: 2024-11-28
Attending: OBSTETRICS & GYNECOLOGY | Admitting: ADVANCED PRACTICE MIDWIFE
Payer: COMMERCIAL

## 2024-11-25 ENCOUNTER — APPOINTMENT (OUTPATIENT)
Dept: OBSTETRICS AND GYNECOLOGY | Facility: HOSPITAL | Age: 24
End: 2024-11-25
Payer: COMMERCIAL

## 2024-11-25 ENCOUNTER — ANESTHESIA EVENT (OUTPATIENT)
Dept: OBSTETRICS AND GYNECOLOGY | Facility: HOSPITAL | Age: 24
End: 2024-11-25

## 2024-11-25 ENCOUNTER — ANESTHESIA (OUTPATIENT)
Dept: OBSTETRICS AND GYNECOLOGY | Facility: HOSPITAL | Age: 24
End: 2024-11-25

## 2024-11-25 DIAGNOSIS — O36.5990 FETAL GROWTH RESTRICTION ANTEPARTUM (HHS-HCC): ICD-10-CM

## 2024-11-25 PROBLEM — E66.9 OBESITY: Status: ACTIVE | Noted: 2024-11-25

## 2024-11-25 LAB
ABO GROUP (TYPE) IN BLOOD: NORMAL
ANTIBODY SCREEN: NORMAL
ERYTHROCYTE [DISTWIDTH] IN BLOOD BY AUTOMATED COUNT: 14.4 % (ref 11.5–14.5)
HCT VFR BLD AUTO: 28.8 % (ref 36–46)
HGB BLD-MCNC: 9.3 G/DL (ref 12–16)
MCH RBC QN AUTO: 28.1 PG (ref 26–34)
MCHC RBC AUTO-ENTMCNC: 32.3 G/DL (ref 32–36)
MCV RBC AUTO: 87 FL (ref 80–100)
NRBC BLD-RTO: 0 /100 WBCS (ref 0–0)
PLATELET # BLD AUTO: 244 X10*3/UL (ref 150–450)
RBC # BLD AUTO: 3.31 X10*6/UL (ref 4–5.2)
RH FACTOR (ANTIGEN D): NORMAL
TREPONEMA PALLIDUM IGG+IGM AB [PRESENCE] IN SERUM OR PLASMA BY IMMUNOASSAY: NONREACTIVE
WBC # BLD AUTO: 5 X10*3/UL (ref 4.4–11.3)

## 2024-11-25 PROCEDURE — 86923 COMPATIBILITY TEST ELECTRIC: CPT

## 2024-11-25 PROCEDURE — 36415 COLL VENOUS BLD VENIPUNCTURE: CPT | Performed by: ADVANCED PRACTICE MIDWIFE

## 2024-11-25 PROCEDURE — 7210000002 HC LABOR PER HOUR

## 2024-11-25 PROCEDURE — 86901 BLOOD TYPING SEROLOGIC RH(D): CPT | Performed by: ADVANCED PRACTICE MIDWIFE

## 2024-11-25 PROCEDURE — 1220000001 HC OB SEMI-PRIVATE ROOM DAILY

## 2024-11-25 PROCEDURE — 85027 COMPLETE CBC AUTOMATED: CPT | Performed by: ADVANCED PRACTICE MIDWIFE

## 2024-11-25 PROCEDURE — 86780 TREPONEMA PALLIDUM: CPT | Performed by: ADVANCED PRACTICE MIDWIFE

## 2024-11-25 PROCEDURE — 59050 FETAL MONITOR W/REPORT: CPT

## 2024-11-25 RX ORDER — ONDANSETRON 4 MG/1
4 TABLET, FILM COATED ORAL EVERY 6 HOURS PRN
Status: DISCONTINUED | OUTPATIENT
Start: 2024-11-25 | End: 2024-11-26

## 2024-11-25 RX ORDER — METOCLOPRAMIDE 10 MG/1
10 TABLET ORAL EVERY 6 HOURS PRN
Status: DISCONTINUED | OUTPATIENT
Start: 2024-11-25 | End: 2024-11-26

## 2024-11-25 RX ORDER — TRANEXAMIC ACID 100 MG/ML
1000 INJECTION, SOLUTION INTRAVENOUS ONCE AS NEEDED
Status: DISCONTINUED | OUTPATIENT
Start: 2024-11-25 | End: 2024-11-26

## 2024-11-25 RX ORDER — TERBUTALINE SULFATE 1 MG/ML
0.25 INJECTION SUBCUTANEOUS ONCE AS NEEDED
Status: DISCONTINUED | OUTPATIENT
Start: 2024-11-25 | End: 2024-11-26

## 2024-11-25 RX ORDER — CARBOPROST TROMETHAMINE 250 UG/ML
250 INJECTION, SOLUTION INTRAMUSCULAR ONCE AS NEEDED
Status: DISCONTINUED | OUTPATIENT
Start: 2024-11-25 | End: 2024-11-26

## 2024-11-25 RX ORDER — LOPERAMIDE HYDROCHLORIDE 2 MG/1
4 CAPSULE ORAL EVERY 2 HOUR PRN
Status: DISCONTINUED | OUTPATIENT
Start: 2024-11-25 | End: 2024-11-26

## 2024-11-25 RX ORDER — OXYTOCIN 10 [USP'U]/ML
10 INJECTION, SOLUTION INTRAMUSCULAR; INTRAVENOUS ONCE AS NEEDED
Status: DISCONTINUED | OUTPATIENT
Start: 2024-11-25 | End: 2024-11-26

## 2024-11-25 RX ORDER — LIDOCAINE HYDROCHLORIDE 10 MG/ML
30 INJECTION, SOLUTION INFILTRATION; PERINEURAL ONCE AS NEEDED
Status: DISCONTINUED | OUTPATIENT
Start: 2024-11-25 | End: 2024-11-26

## 2024-11-25 RX ORDER — METHYLERGONOVINE MALEATE 0.2 MG/ML
0.2 INJECTION INTRAVENOUS ONCE AS NEEDED
Status: DISCONTINUED | OUTPATIENT
Start: 2024-11-25 | End: 2024-11-26

## 2024-11-25 RX ORDER — LABETALOL HYDROCHLORIDE 5 MG/ML
20 INJECTION, SOLUTION INTRAVENOUS ONCE AS NEEDED
Status: DISCONTINUED | OUTPATIENT
Start: 2024-11-25 | End: 2024-11-26

## 2024-11-25 RX ORDER — METOCLOPRAMIDE HYDROCHLORIDE 5 MG/ML
10 INJECTION INTRAMUSCULAR; INTRAVENOUS EVERY 6 HOURS PRN
Status: DISCONTINUED | OUTPATIENT
Start: 2024-11-25 | End: 2024-11-26

## 2024-11-25 RX ORDER — MISOPROSTOL 200 UG/1
800 TABLET ORAL ONCE AS NEEDED
Status: DISCONTINUED | OUTPATIENT
Start: 2024-11-25 | End: 2024-11-26

## 2024-11-25 RX ORDER — OXYTOCIN/0.9 % SODIUM CHLORIDE 30/500 ML
60 PLASTIC BAG, INJECTION (ML) INTRAVENOUS ONCE AS NEEDED
Status: DISCONTINUED | OUTPATIENT
Start: 2024-11-25 | End: 2024-11-26

## 2024-11-25 RX ORDER — ONDANSETRON HYDROCHLORIDE 2 MG/ML
4 INJECTION, SOLUTION INTRAVENOUS EVERY 6 HOURS PRN
Status: DISCONTINUED | OUTPATIENT
Start: 2024-11-25 | End: 2024-11-26

## 2024-11-25 RX ORDER — OXYTOCIN 10 [USP'U]/ML
10 INJECTION, SOLUTION INTRAMUSCULAR; INTRAVENOUS ONCE AS NEEDED
Status: COMPLETED | OUTPATIENT
Start: 2024-11-25 | End: 2024-11-26

## 2024-11-25 RX ORDER — HYDRALAZINE HYDROCHLORIDE 20 MG/ML
5 INJECTION INTRAMUSCULAR; INTRAVENOUS ONCE AS NEEDED
Status: DISCONTINUED | OUTPATIENT
Start: 2024-11-25 | End: 2024-11-26

## 2024-11-25 RX ORDER — NIFEDIPINE 10 MG/1
10 CAPSULE ORAL ONCE AS NEEDED
Status: DISCONTINUED | OUTPATIENT
Start: 2024-11-25 | End: 2024-11-26

## 2024-11-25 SDOH — HEALTH STABILITY: MENTAL HEALTH: CURRENT SMOKER: 0

## 2024-11-25 SDOH — SOCIAL STABILITY: SOCIAL INSECURITY: HAVE YOU HAD THOUGHTS OF HARMING ANYONE ELSE?: NO

## 2024-11-25 SDOH — HEALTH STABILITY: MENTAL HEALTH: SUICIDAL BEHAVIOR (LIFETIME): NO

## 2024-11-25 SDOH — SOCIAL STABILITY: SOCIAL INSECURITY: DO YOU FEEL ANYONE HAS EXPLOITED OR TAKEN ADVANTAGE OF YOU FINANCIALLY OR OF YOUR PERSONAL PROPERTY?: NO

## 2024-11-25 SDOH — SOCIAL STABILITY: SOCIAL INSECURITY: HAS ANYONE EVER THREATENED TO HURT YOUR FAMILY OR YOUR PETS?: NO

## 2024-11-25 SDOH — SOCIAL STABILITY: SOCIAL INSECURITY: VERBAL ABUSE: DENIES

## 2024-11-25 SDOH — HEALTH STABILITY: MENTAL HEALTH: HAVE YOU USED ANY PRESCRIPTION DRUGS OTHER THAN PRESCRIBED IN THE PAST 12 MONTHS?: NO

## 2024-11-25 SDOH — SOCIAL STABILITY: SOCIAL INSECURITY: ARE YOU OR HAVE YOU BEEN THREATENED OR ABUSED PHYSICALLY, EMOTIONALLY, OR SEXUALLY BY ANYONE?: NO

## 2024-11-25 SDOH — SOCIAL STABILITY: SOCIAL INSECURITY: ABUSE SCREEN: ADULT

## 2024-11-25 SDOH — ECONOMIC STABILITY: HOUSING INSECURITY: DO YOU FEEL UNSAFE GOING BACK TO THE PLACE WHERE YOU ARE LIVING?: NO

## 2024-11-25 SDOH — HEALTH STABILITY: MENTAL HEALTH: HAVE YOU USED ANY SUBSTANCES (CANABIS, COCAINE, HEROIN, HALLUCINOGENS, INHALANTS, ETC.) IN THE PAST 12 MONTHS?: NO

## 2024-11-25 SDOH — SOCIAL STABILITY: SOCIAL INSECURITY: DOES ANYONE TRY TO KEEP YOU FROM HAVING/CONTACTING OTHER FRIENDS OR DOING THINGS OUTSIDE YOUR HOME?: NO

## 2024-11-25 SDOH — SOCIAL STABILITY: SOCIAL INSECURITY: PHYSICAL ABUSE: DENIES

## 2024-11-25 SDOH — SOCIAL STABILITY: SOCIAL INSECURITY: ARE THERE ANY APPARENT SIGNS OF INJURIES/BEHAVIORS THAT COULD BE RELATED TO ABUSE/NEGLECT?: NO

## 2024-11-25 SDOH — HEALTH STABILITY: MENTAL HEALTH: NON-SPECIFIC ACTIVE SUICIDAL THOUGHTS (PAST 1 MONTH): NO

## 2024-11-25 SDOH — SOCIAL STABILITY: SOCIAL INSECURITY: HAVE YOU HAD ANY THOUGHTS OF HARMING ANYONE ELSE?: NO

## 2024-11-25 SDOH — HEALTH STABILITY: MENTAL HEALTH: WERE YOU ABLE TO COMPLETE ALL THE BEHAVIORAL HEALTH SCREENINGS?: YES

## 2024-11-25 SDOH — HEALTH STABILITY: MENTAL HEALTH: WISH TO BE DEAD (PAST 1 MONTH): NO

## 2024-11-25 ASSESSMENT — LIFESTYLE VARIABLES
SKIP TO QUESTIONS 9-10: 1
AUDIT-C TOTAL SCORE: 0
HOW OFTEN DO YOU HAVE 6 OR MORE DRINKS ON ONE OCCASION: NEVER
HOW OFTEN DO YOU HAVE A DRINK CONTAINING ALCOHOL: NEVER
HOW MANY STANDARD DRINKS CONTAINING ALCOHOL DO YOU HAVE ON A TYPICAL DAY: PATIENT DOES NOT DRINK
AUDIT-C TOTAL SCORE: 0

## 2024-11-25 ASSESSMENT — PATIENT HEALTH QUESTIONNAIRE - PHQ9
SUM OF ALL RESPONSES TO PHQ9 QUESTIONS 1 & 2: 0
1. LITTLE INTEREST OR PLEASURE IN DOING THINGS: NOT AT ALL
2. FEELING DOWN, DEPRESSED OR HOPELESS: NOT AT ALL

## 2024-11-25 ASSESSMENT — PAIN SCALES - GENERAL
PAINLEVEL_OUTOF10: 0 - NO PAIN
PAINLEVEL_OUTOF10: 0 - NO PAIN

## 2024-11-26 ENCOUNTER — ANESTHESIA EVENT (OUTPATIENT)
Dept: OBSTETRICS AND GYNECOLOGY | Facility: HOSPITAL | Age: 24
End: 2024-11-26

## 2024-11-26 ENCOUNTER — ANESTHESIA (OUTPATIENT)
Dept: OBSTETRICS AND GYNECOLOGY | Facility: HOSPITAL | Age: 24
End: 2024-11-26

## 2024-11-26 PROCEDURE — 2500000001 HC RX 250 WO HCPCS SELF ADMINISTERED DRUGS (ALT 637 FOR MEDICARE OP): Performed by: ADVANCED PRACTICE MIDWIFE

## 2024-11-26 PROCEDURE — 3700000014 EPIDURAL BLOCK: Performed by: ANESTHESIOLOGY

## 2024-11-26 PROCEDURE — 82570 ASSAY OF URINE CREATININE: CPT

## 2024-11-26 PROCEDURE — 2500000004 HC RX 250 GENERAL PHARMACY W/ HCPCS (ALT 636 FOR OP/ED): Performed by: ADVANCED PRACTICE MIDWIFE

## 2024-11-26 PROCEDURE — 3E0P7VZ INTRODUCTION OF HORMONE INTO FEMALE REPRODUCTIVE, VIA NATURAL OR ARTIFICIAL OPENING: ICD-10-PCS | Performed by: ADVANCED PRACTICE MIDWIFE

## 2024-11-26 PROCEDURE — 2500000004 HC RX 250 GENERAL PHARMACY W/ HCPCS (ALT 636 FOR OP/ED)

## 2024-11-26 PROCEDURE — 51701 INSERT BLADDER CATHETER: CPT

## 2024-11-26 PROCEDURE — 7210000002 HC LABOR PER HOUR

## 2024-11-26 PROCEDURE — 2500000004 HC RX 250 GENERAL PHARMACY W/ HCPCS (ALT 636 FOR OP/ED): Performed by: ANESTHESIOLOGIST ASSISTANT

## 2024-11-26 PROCEDURE — 10H07YZ INSERTION OF OTHER DEVICE INTO PRODUCTS OF CONCEPTION, VIA NATURAL OR ARTIFICIAL OPENING: ICD-10-PCS | Performed by: NURSE PRACTITIONER

## 2024-11-26 PROCEDURE — 4A1H7CZ MONITORING OF PRODUCTS OF CONCEPTION, CARDIAC RATE, VIA NATURAL OR ARTIFICIAL OPENING: ICD-10-PCS | Performed by: NURSE PRACTITIONER

## 2024-11-26 PROCEDURE — 88307 TISSUE EXAM BY PATHOLOGIST: CPT | Mod: TC,SUR

## 2024-11-26 PROCEDURE — 2500000001 HC RX 250 WO HCPCS SELF ADMINISTERED DRUGS (ALT 637 FOR MEDICARE OP)

## 2024-11-26 PROCEDURE — 84075 ASSAY ALKALINE PHOSPHATASE: CPT

## 2024-11-26 PROCEDURE — 59409 OBSTETRICAL CARE: CPT

## 2024-11-26 PROCEDURE — 88307 TISSUE EXAM BY PATHOLOGIST: CPT | Performed by: PATHOLOGY

## 2024-11-26 PROCEDURE — 59410 OBSTETRICAL CARE: CPT

## 2024-11-26 PROCEDURE — 3E0E77Z INTRODUCTION OF ELECTROLYTIC AND WATER BALANCE SUBSTANCE INTO PRODUCTS OF CONCEPTION, VIA NATURAL OR ARTIFICIAL OPENING: ICD-10-PCS

## 2024-11-26 PROCEDURE — 83615 LACTATE (LD) (LDH) ENZYME: CPT

## 2024-11-26 PROCEDURE — 10907ZC DRAINAGE OF AMNIOTIC FLUID, THERAPEUTIC FROM PRODUCTS OF CONCEPTION, VIA NATURAL OR ARTIFICIAL OPENING: ICD-10-PCS | Performed by: NURSE PRACTITIONER

## 2024-11-26 PROCEDURE — 2500000004 HC RX 250 GENERAL PHARMACY W/ HCPCS (ALT 636 FOR OP/ED): Performed by: STUDENT IN AN ORGANIZED HEALTH CARE EDUCATION/TRAINING PROGRAM

## 2024-11-26 PROCEDURE — 1210000001 HC SEMI-PRIVATE ROOM DAILY

## 2024-11-26 PROCEDURE — 0HQ9XZZ REPAIR PERINEUM SKIN, EXTERNAL APPROACH: ICD-10-PCS

## 2024-11-26 PROCEDURE — 2500000004 HC RX 250 GENERAL PHARMACY W/ HCPCS (ALT 636 FOR OP/ED): Performed by: NURSE PRACTITIONER

## 2024-11-26 PROCEDURE — 7100000016 HC LABOR RECOVERY PER HOUR

## 2024-11-26 RX ORDER — METHYLERGONOVINE MALEATE 0.2 MG/ML
0.2 INJECTION INTRAVENOUS ONCE AS NEEDED
Status: DISCONTINUED | OUTPATIENT
Start: 2024-11-26 | End: 2024-11-28 | Stop reason: HOSPADM

## 2024-11-26 RX ORDER — OXYTOCIN 10 [USP'U]/ML
10 INJECTION, SOLUTION INTRAMUSCULAR; INTRAVENOUS ONCE AS NEEDED
Status: DISCONTINUED | OUTPATIENT
Start: 2024-11-26 | End: 2024-11-28 | Stop reason: HOSPADM

## 2024-11-26 RX ORDER — IBUPROFEN 600 MG/1
600 TABLET ORAL EVERY 6 HOURS
Status: DISCONTINUED | OUTPATIENT
Start: 2024-11-26 | End: 2024-11-28 | Stop reason: HOSPADM

## 2024-11-26 RX ORDER — ADHESIVE BANDAGE
10 BANDAGE TOPICAL
Status: DISCONTINUED | OUTPATIENT
Start: 2024-11-26 | End: 2024-11-28 | Stop reason: HOSPADM

## 2024-11-26 RX ORDER — HYDRALAZINE HYDROCHLORIDE 20 MG/ML
5 INJECTION INTRAMUSCULAR; INTRAVENOUS ONCE AS NEEDED
Status: DISCONTINUED | OUTPATIENT
Start: 2024-11-26 | End: 2024-11-28 | Stop reason: HOSPADM

## 2024-11-26 RX ORDER — BISACODYL 10 MG/1
10 SUPPOSITORY RECTAL DAILY PRN
Status: DISCONTINUED | OUTPATIENT
Start: 2024-11-26 | End: 2024-11-28 | Stop reason: HOSPADM

## 2024-11-26 RX ORDER — ACETAMINOPHEN 325 MG/1
975 TABLET ORAL EVERY 6 HOURS
Status: DISCONTINUED | OUTPATIENT
Start: 2024-11-26 | End: 2024-11-28 | Stop reason: HOSPADM

## 2024-11-26 RX ORDER — POLYETHYLENE GLYCOL 3350 17 G/17G
17 POWDER, FOR SOLUTION ORAL 2 TIMES DAILY PRN
Status: DISCONTINUED | OUTPATIENT
Start: 2024-11-26 | End: 2024-11-28 | Stop reason: HOSPADM

## 2024-11-26 RX ORDER — DIPHENHYDRAMINE HCL 25 MG
25 CAPSULE ORAL EVERY 6 HOURS PRN
Status: DISCONTINUED | OUTPATIENT
Start: 2024-11-26 | End: 2024-11-28 | Stop reason: HOSPADM

## 2024-11-26 RX ORDER — LOPERAMIDE HYDROCHLORIDE 2 MG/1
4 CAPSULE ORAL EVERY 2 HOUR PRN
Status: DISCONTINUED | OUTPATIENT
Start: 2024-11-26 | End: 2024-11-28 | Stop reason: HOSPADM

## 2024-11-26 RX ORDER — ACETAMINOPHEN 325 MG/1
975 TABLET ORAL ONCE
Status: COMPLETED | OUTPATIENT
Start: 2024-11-26 | End: 2024-11-26

## 2024-11-26 RX ORDER — SIMETHICONE 80 MG
80 TABLET,CHEWABLE ORAL 4 TIMES DAILY PRN
Status: DISCONTINUED | OUTPATIENT
Start: 2024-11-26 | End: 2024-11-28 | Stop reason: HOSPADM

## 2024-11-26 RX ORDER — NIFEDIPINE 10 MG/1
10 CAPSULE ORAL ONCE AS NEEDED
Status: DISCONTINUED | OUTPATIENT
Start: 2024-11-26 | End: 2024-11-28 | Stop reason: HOSPADM

## 2024-11-26 RX ORDER — DIPHENHYDRAMINE HYDROCHLORIDE 50 MG/ML
25 INJECTION INTRAMUSCULAR; INTRAVENOUS EVERY 6 HOURS PRN
Status: DISCONTINUED | OUTPATIENT
Start: 2024-11-26 | End: 2024-11-28 | Stop reason: HOSPADM

## 2024-11-26 RX ORDER — CHLOROPROCAINE HYDROCHLORIDE 30 MG/ML
INJECTION, SOLUTION EPIDURAL; INFILTRATION; INTRACAUDAL; PERINEURAL AS NEEDED
Status: DISCONTINUED | OUTPATIENT
Start: 2024-11-26 | End: 2024-11-26

## 2024-11-26 RX ORDER — NALBUPHINE HYDROCHLORIDE 10 MG/ML
10 INJECTION INTRAMUSCULAR; INTRAVENOUS; SUBCUTANEOUS ONCE
Status: COMPLETED | OUTPATIENT
Start: 2024-11-26 | End: 2024-11-26

## 2024-11-26 RX ORDER — LIDOCAINE HCL/EPINEPHRINE/PF 2%-1:200K
VIAL (ML) INJECTION AS NEEDED
Status: DISCONTINUED | OUTPATIENT
Start: 2024-11-26 | End: 2024-11-26

## 2024-11-26 RX ORDER — FENTANYL/ROPIVACAINE/NS/PF 2MCG/ML-.2
0-25 PLASTIC BAG, INJECTION (ML) INJECTION CONTINUOUS
Status: DISCONTINUED | OUTPATIENT
Start: 2024-11-26 | End: 2024-11-26

## 2024-11-26 RX ORDER — ONDANSETRON 4 MG/1
4 TABLET, FILM COATED ORAL EVERY 6 HOURS PRN
Status: DISCONTINUED | OUTPATIENT
Start: 2024-11-26 | End: 2024-11-28 | Stop reason: HOSPADM

## 2024-11-26 RX ORDER — LABETALOL HYDROCHLORIDE 5 MG/ML
20 INJECTION, SOLUTION INTRAVENOUS ONCE AS NEEDED
Status: DISCONTINUED | OUTPATIENT
Start: 2024-11-26 | End: 2024-11-28 | Stop reason: HOSPADM

## 2024-11-26 RX ORDER — TRANEXAMIC ACID 100 MG/ML
1000 INJECTION, SOLUTION INTRAVENOUS ONCE AS NEEDED
Status: DISCONTINUED | OUTPATIENT
Start: 2024-11-26 | End: 2024-11-28 | Stop reason: HOSPADM

## 2024-11-26 RX ORDER — MISOPROSTOL 200 UG/1
800 TABLET ORAL ONCE AS NEEDED
Status: DISCONTINUED | OUTPATIENT
Start: 2024-11-26 | End: 2024-11-28 | Stop reason: HOSPADM

## 2024-11-26 RX ORDER — LIDOCAINE 560 MG/1
1 PATCH PERCUTANEOUS; TOPICAL; TRANSDERMAL
Status: DISCONTINUED | OUTPATIENT
Start: 2024-11-26 | End: 2024-11-28 | Stop reason: HOSPADM

## 2024-11-26 RX ORDER — OXYTOCIN/0.9 % SODIUM CHLORIDE 30/500 ML
2-30 PLASTIC BAG, INJECTION (ML) INTRAVENOUS CONTINUOUS
Status: DISCONTINUED | OUTPATIENT
Start: 2024-11-26 | End: 2024-11-26

## 2024-11-26 RX ORDER — OXYTOCIN/0.9 % SODIUM CHLORIDE 30/500 ML
60 PLASTIC BAG, INJECTION (ML) INTRAVENOUS ONCE AS NEEDED
Status: DISCONTINUED | OUTPATIENT
Start: 2024-11-26 | End: 2024-11-28 | Stop reason: HOSPADM

## 2024-11-26 RX ORDER — SODIUM CHLORIDE 9 MG/ML
180 INJECTION, SOLUTION INTRAVENOUS CONTINUOUS
Status: DISCONTINUED | OUTPATIENT
Start: 2024-11-26 | End: 2024-11-26

## 2024-11-26 RX ORDER — CARBOPROST TROMETHAMINE 250 UG/ML
250 INJECTION, SOLUTION INTRAMUSCULAR ONCE AS NEEDED
Status: DISCONTINUED | OUTPATIENT
Start: 2024-11-26 | End: 2024-11-28 | Stop reason: HOSPADM

## 2024-11-26 RX ORDER — ONDANSETRON HYDROCHLORIDE 2 MG/ML
4 INJECTION, SOLUTION INTRAVENOUS EVERY 6 HOURS PRN
Status: DISCONTINUED | OUTPATIENT
Start: 2024-11-26 | End: 2024-11-28 | Stop reason: HOSPADM

## 2024-11-26 RX ADMIN — SODIUM CHLORIDE 500 ML: 9 INJECTION, SOLUTION INTRAVENOUS at 16:02

## 2024-11-26 RX ADMIN — ACETAMINOPHEN 975 MG: 325 TABLET ORAL at 04:04

## 2024-11-26 RX ADMIN — MISOPROSTOL 25 MCG: 100 TABLET ORAL at 06:40

## 2024-11-26 RX ADMIN — METOCLOPRAMIDE 10 MG: 5 INJECTION, SOLUTION INTRAMUSCULAR; INTRAVENOUS at 12:14

## 2024-11-26 RX ADMIN — OXYTOCIN 10 UNITS: 10 INJECTION INTRAVENOUS at 20:45

## 2024-11-26 RX ADMIN — ONDANSETRON 4 MG: 2 INJECTION INTRAMUSCULAR; INTRAVENOUS at 11:51

## 2024-11-26 RX ADMIN — NALBUPHINE HYDROCHLORIDE 10 MG: 10 INJECTION, SOLUTION INTRAMUSCULAR; INTRAVENOUS; SUBCUTANEOUS at 12:28

## 2024-11-26 RX ADMIN — SODIUM CHLORIDE 600 ML: 0.9 INJECTION, SOLUTION INTRAVENOUS at 19:57

## 2024-11-26 SDOH — HEALTH STABILITY: MENTAL HEALTH: CURRENT SMOKER: 0

## 2024-11-26 ASSESSMENT — PAIN SCALES - GENERAL
PAINLEVEL_OUTOF10: 0 - NO PAIN
PAINLEVEL_OUTOF10: 0 - NO PAIN
PAINLEVEL_OUTOF10: 8
PAINLEVEL_OUTOF10: 0 - NO PAIN
PAINLEVEL_OUTOF10: 3

## 2024-11-26 NOTE — CARE PLAN
The patient's goals for the shift include      The clinical goals for the shift include start induction    Over the shift, the patient did not make progress toward the following goals. Barriers to progression include ***. Recommendations to address these barriers include ***.

## 2024-11-26 NOTE — ANESTHESIA PROCEDURE NOTES
Epidural Block    Patient location during procedure: floor  Start time: 11/26/2024 6:44 PM  End time: 11/26/2024 6:56 PM  Reason for block: labor analgesia  Staffing  Performed: TA   Authorized by: Andrea Fitzpatrick MD    Performed by: TA Odonnell    Preanesthetic Checklist  Completed: patient identified, IV checked, risks and benefits discussed, surgical consent, pre-op evaluation, timeout performed and sterile techniques followed  Block Timeout  RN/Licensed healthcare professional reads aloud to the Anesthesia provider and entire team: Patient identity, procedure with side and site, patient position, and as applicable the availability of implants/special equipment/special requirements.  Patient on coagulant treatment: no  Timeout performed at: 11/26/2024 6:44 PM  Block Placement  Patient position: sitting  Prep: ChloraPrep  Sterility prep: cap, drape, gloves and mask  Sedation level: no sedation  Patient monitoring: blood pressure, continuous pulse oximetry and heart rate  Approach: midline  Local numbing: lidocaine 1% to skin and subcutaneous tissues  Vertebral space: lumbar  Lumbar location: L3-L4  Epidural  Loss of resistance technique: saline  Guidance: landmark technique        Needle  Needle type: Tuohy   Needle gauge: 17  Needle length: 8.9cm  Needle insertion depth: 7 cm  Catheter type: multi-orifice  Catheter size: 19 G  Catheter at skin depth: 12 cm  Catheter securement method: clear occlusive dressing and liquid medical adhesive    Test dose: lidocaine 1.5% with epinephrine 1-to-200,000  Test dose: lidocaine 1.5% with epinephrine 1-to-200,000  Test dose result: no positive test dose    PCEA  Medication concentration used: 0.2% Ropivacaine with 2 mcg/mL Fentanyl  Dose (mL): 5  Lockout (minutes): 30  1-Hour Limit (boluses/hr): 2  Basal Rate: 10        Assessment  Events: no positive test dose  Procedure assessment: patient tolerated procedure well with no immediate complications
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
## 2024-11-26 NOTE — SIGNIFICANT EVENT
CRB inserted through cervical os and inflated with 60cc saline.   Placement of balloon confirmed with gentle traction.   Patient tolerated well. FHT baseline 135, moderate variability, +accels, -decels. [place]: flat. Will continue to monitor maternal/fetal status.    Melissa L Zahorsky, APRN-WHITNEY

## 2024-11-26 NOTE — ANESTHESIA PROCEDURE NOTES
Epidural Block    Patient location during procedure: floor  Start time: 11/26/2024 4:32 PM  End time: 11/26/2024 4:48 PM  Reason for block: labor analgesia  Staffing  Performed: TA   Authorized by: Andrea Fitzpatrick MD    Performed by: TA Odonnell    Preanesthetic Checklist  Completed: patient identified, IV checked, risks and benefits discussed, surgical consent, pre-op evaluation, timeout performed and sterile techniques followed  Block Timeout  RN/Licensed healthcare professional reads aloud to the Anesthesia provider and entire team: Patient identity, procedure with side and site, patient position, and as applicable the availability of implants/special equipment/special requirements.  Patient on coagulant treatment: no  Timeout performed at: 11/26/2024 4:32 PM  Block Placement  Patient position: sitting  Prep: ChloraPrep  Sterility prep: cap, drape, gloves and mask  Sedation level: no sedation  Patient monitoring: blood pressure, continuous pulse oximetry and heart rate  Approach: midline  Local numbing: lidocaine 1% to skin and subcutaneous tissues  Vertebral space: lumbar  Lumbar location: L4-L5  Epidural  Loss of resistance technique: saline  Guidance: landmark technique        Needle  Needle type: Tuohy   Needle gauge: 17  Needle length: 8.9cm  Needle insertion depth: 6 cm  Catheter type: multi-orifice  Catheter size: 19 G  Catheter at skin depth: 11 cm  Catheter securement method: clear occlusive dressing and liquid medical adhesive    Test dose result: no positive test dose    PCEA  Medication concentration used: 0.2% Ropivacaine with 2 mcg/mL Fentanyl  Dose (mL): 5  Lockout (minutes): 30  1-Hour Limit (boluses/hr): 2  Basal Rate: 10        Assessment  Events: no positive test dose  Procedure assessment: patient tolerated procedure well with no immediate complications

## 2024-11-26 NOTE — ANESTHESIA PREPROCEDURE EVALUATION
Patient: Dia Aaron    Evaluation Method: In-person visit    Procedure Information    Date: 24  Procedure: Labor Consult         Relevant Problems   Anesthesia (within normal limits)      Cardiac (within normal limits)      Pulmonary (within normal limits)      Neuro   (+) Depression      GI (within normal limits)      /Renal (within normal limits)      Liver (within normal limits)      Endocrine   (+) Obesity      Hematology   (+) Anemia during pregnancy in second trimester ([facility])      Musculoskeletal (within normal limits)      HEENT (within normal limits)      ID (within normal limits)      Skin (within normal limits)      GYN   (+) 38 weeks gestation of pregnancy ([facility])       Clinical information reviewed:   Tobacco  Allergies  Meds   Med Hx  Surg Hx   Fam Hx          NPO Detail: Nothing to eat since 20:00 on 24       OB/Gyn Evaluation    Present Pregnancy    Patient is pregnant now ( @ 38w5d).  (+) , fetal growth restriction   Obstetric History                Physical Exam    Airway  Mallampati: III  TM distance: >3 FB  Neck ROM: full     Cardiovascular    Dental     Comments: Tooth 8 bent inwards, tongue piercing in.    Pulmonary    Abdominal   (+) obese         Visit Vitals  /71   Pulse 61   Temp 36.8 °C (98.2 °F) (Oral)   Resp 16        Anesthesia Plan    History of general anesthesia?: no  History of complications of general anesthesia?: no    ASA 3     epidural   (R/B/A of neuraxial anesthesia explained to patient. )  The patient is not a current smoker.  Patient did not smoke on day of procedure.    Anesthetic plan and risks discussed with patient.  Use of blood products discussed with patient who consented to blood products.

## 2024-11-26 NOTE — SIGNIFICANT EVENT
Assessment    24 y.o.  at 38w6d  FHT Category 1  Latent labor  FGR, EFW 8%, AC 3%  Anemia, admission hgb = 9.3  GBS neg  Plan    IUPC and FSE placed without difficulty, patient tolerated well  Contractions too frequent at this time to start pitocin, will continue to monitor and consider starting if contractions space  Encourage frequent position changes as tolerated  Encourage ambulation as tolerated  Pain management per patient request  Continue assessment of maternal and fetal wellbeing  Recheck as clinically indicated by maternal or fetal status  Anticipate active phase of labor    Fanta Fletcher, APRN-CNM, APRN-CNP    Subjective:  Dia Aaron is cierra uncomfortably, coping well. Received nubain at 1230 with some relief. RN reporting difficulty with external CEFM with maternal repositioning. Patient agreeable to CE and placement of internal monitors.     Objective:  Fetal Monitoring      Baseline FHR: 140 per minute  Variability: moderate  Accelerations: yes  Decelerations:  questionable variable decels heard audibly by RN, FHR not tracing well to confirm  TOCO: regular, every 2-3 minutes    Cervical Exam:  4 cm dilated, 80 effaced, -2 station    Membrane status: ruptured (AROM at 1030)    Vitals:    24 1357 24 1402 24 1435 24 1436   BP:   137/73    Pulse: 64 79 63 71   Resp:       Temp:       TempSrc:       SpO2: 98% 100% (!) 92% 100%   Weight:

## 2024-11-26 NOTE — SIGNIFICANT EVENT
Pt. Sleeping soundly since CRB removal.   Discussed induction process and consents to cervical exam.   SVE 2/60/-2 soft, mid.   FHT: Cat I   TOCO: none   VSS    Cyto #1 inserted PV without difficulty.   Discussed various pain options when desired.   Continue to monitor maternal/fetal status    Viviana Gagnon, MARÍA-WHITNEY

## 2024-11-26 NOTE — PROGRESS NOTES
"Assessment    24 y.o.  at 38w6d  FHT Category 1  GBS neg  Not yet in labor    Plan    Tylenol ordered for headache. CRB removed.  Pt declines SVE at this time, wants to rest.  Encouraged pt to notify RN when ready for next steps.   Pain management per patient request  Continue assessment of maternal and fetal wellbeing  Recheck as clinically indicated by maternal or fetal status    Melissa L Zahorsky, MARÍA-WHITNEY    Subjective:  Called to bedside by RN, pt cramping and very uncomfortable, requesting CRB to be taken out. Also reports having a \"pounding headache\" that she relates to being \"stressed\" from the pain. Denies vision changes, RUQ pain, CP, SOB. Feeling nauseous.    Objective:  Fetal Monitoring      Baseline FHR: 135 per minute  Variability: moderate  Accelerations: yes  Decelerations: none  TOCO:  irritability  VSS, normotensive  Cervical Exam:  deferred, pt declines at this time    Membrane status: intact      Vitals:    24 2333 24 2337 24 2338 24 0321   BP:  110/59  116/78   Pulse: 97 92 91 76   Resp:  18  18   Temp:  36.9 °C (98.4 °F)  36.3 °C (97.3 °F)   TempSrc:  Temporal  Temporal   SpO2: 100% 98% 100% 99%   Weight:          "

## 2024-11-26 NOTE — SIGNIFICANT EVENT
Assessment    24 y.o.  at 38w6d  FHT Category 1  Latent labor  FGR, EFW 8%, AC 3%  Anemia, admission hgb = 9.3  GBS neg  Plan    AROM'd for clear fluid  Will start pitocin per protocol if indicated  Encourage frequent position changes as tolerated  Encourage ambulation as tolerated  Pain management per patient request  Continue assessment of maternal and fetal wellbeing  Recheck as clinically indicated by maternal or fetal status  Anticipate active phase of labor    Fanta Fletcher, APRN-CNM, APRN-CNP    Subjective:  Dia Aaron is comfortable, denies feeling contractions. Last cytotec placed at 0640. Patient agreeable to CE and AROM.    Objective:  Fetal Monitoring      Baseline FHR: 140 per minute  Variability: moderate  Accelerations: yes  Decelerations: none  TOCO: regular, every 3-5 minutes    Cervical Exam:  3 cm dilated, 70 effaced, -2 station    Membrane status: ruptured - AROM'd for small amount of clear fluid    Vitals:    24 0321 24 0625 24 0726 24 0727   BP: 116/78 104/59  117/62   Pulse: 76 67 66 69   Resp: 18 18  18   Temp: 36.3 °C (97.3 °F)   36.8 °C (98.2 °F)   TempSrc: Temporal   Oral   SpO2: 99% 98% 98% 98%   Weight:

## 2024-11-26 NOTE — ANESTHESIA PREPROCEDURE EVALUATION
Patient: Dia Aaron    Evaluation Method: In-person visit    Procedure Information    Date: 24  Procedure: Labor Consult         Relevant Problems   Anesthesia (within normal limits)      Cardiac (within normal limits)      Pulmonary (within normal limits)      Neuro   (+) Depression      GI (within normal limits)      /Renal (within normal limits)      Liver (within normal limits)      Endocrine   (+) Obesity      Hematology   (+) Anemia during pregnancy in second trimester ([facility])      Musculoskeletal (within normal limits)      HEENT (within normal limits)      ID (within normal limits)      Skin (within normal limits)      GYN   (+) 38 weeks gestation of pregnancy ([facility])       Clinical information reviewed:   Tobacco  Allergies  Meds   Med Hx  Surg Hx   Fam Hx          NPO Detail: Nothing to eat since 20:00 on 24       OB/Gyn Evaluation    Present Pregnancy    Patient is pregnant now ( @ 38w5d).  (+) , fetal growth restriction   Obstetric History                Physical Exam    Airway  Mallampati: III  TM distance: >3 FB  Neck ROM: full     Cardiovascular    Dental     Comments: Tooth 8 bent inwards, tongue piercing in.    Pulmonary    Abdominal   (+) obese         Visit Vitals  /60   Pulse 79   Temp 36.7 °C (98.1 °F)        Anesthesia Plan    History of general anesthesia?: no  History of complications of general anesthesia?: no    ASA 3     epidural   (R/B/A of neuraxial anesthesia explained to patient. )  The patient is not a current smoker.  Patient did not smoke on day of procedure.    Anesthetic plan and risks discussed with patient.  Use of blood products discussed with patient who consented to blood products.

## 2024-11-26 NOTE — PROGRESS NOTES
Assessment    24 y.o.  at 38w6d  FHT Category 1  GBS neg  FGR    Plan    Encourage continued rest  Continue assessment of maternal and fetal wellbeing  Recheck as clinically indicated by maternal or fetal status  Anticipate CRB out soon    Melissa L Zahorsky, APRN-CNM    Subjective:  In to check on Dia, she is sleeping comfortably. CRB still in place. Partner resting at bedside.    Objective:  Fetal Monitoring      Baseline FHR: 140 per minute  Variability: moderate  Accelerations: yes  Decelerations: none  TOCO:  irritability    Membrane status: intact      Vitals:    24 2328 24 2333 24 2337 24 2338   BP:   110/59    Pulse: 83 97 92 91   Resp:   18    Temp:   36.9 °C (98.4 °F)    TempSrc:   Temporal    SpO2: 100% 100% 98% 100%   Weight:

## 2024-11-27 LAB
ALBUMIN SERPL BCP-MCNC: 3.4 G/DL (ref 3.4–5)
ALP SERPL-CCNC: 133 U/L (ref 33–110)
ALT SERPL W P-5'-P-CCNC: 7 U/L (ref 7–45)
ANION GAP SERPL CALC-SCNC: 13 MMOL/L (ref 10–20)
AST SERPL W P-5'-P-CCNC: 13 U/L (ref 9–39)
BILIRUB SERPL-MCNC: 0.3 MG/DL (ref 0–1.2)
BUN SERPL-MCNC: 8 MG/DL (ref 6–23)
CALCIUM SERPL-MCNC: 8.5 MG/DL (ref 8.6–10.6)
CHLORIDE SERPL-SCNC: 106 MMOL/L (ref 98–107)
CO2 SERPL-SCNC: 21 MMOL/L (ref 21–32)
CREAT SERPL-MCNC: 0.59 MG/DL (ref 0.5–1.05)
CREAT UR-MCNC: 16.7 MG/DL (ref 20–320)
EGFRCR SERPLBLD CKD-EPI 2021: >90 ML/MIN/1.73M*2
GLUCOSE SERPL-MCNC: 138 MG/DL (ref 74–99)
LDH SERPL L TO P-CCNC: 166 U/L (ref 84–246)
POTASSIUM SERPL-SCNC: 3.6 MMOL/L (ref 3.5–5.3)
PROT SERPL-MCNC: 6.7 G/DL (ref 6.4–8.2)
PROT UR-ACNC: 4 MG/DL (ref 5–24)
PROT/CREAT UR: 0.24 MG/MG CREAT (ref 0–0.17)
SODIUM SERPL-SCNC: 136 MMOL/L (ref 136–145)

## 2024-11-27 PROCEDURE — 1100000001 HC PRIVATE ROOM DAILY

## 2024-11-27 PROCEDURE — 2720000007 HC OR 272 NO HCPCS

## 2024-11-27 PROCEDURE — RXMED WILLOW AMBULATORY MEDICATION CHARGE

## 2024-11-27 RX ORDER — ACETAMINOPHEN 325 MG/1
975 TABLET ORAL EVERY 6 HOURS PRN
Qty: 56 TABLET | Refills: 0 | Status: SHIPPED | OUTPATIENT
Start: 2024-11-27

## 2024-11-27 RX ORDER — IBUPROFEN 600 MG/1
600 TABLET ORAL EVERY 6 HOURS PRN
Qty: 56 TABLET | Refills: 0 | Status: SHIPPED | OUTPATIENT
Start: 2024-11-27

## 2024-11-27 SDOH — ECONOMIC STABILITY: TRANSPORTATION INSECURITY: IN THE PAST 12 MONTHS, HAS LACK OF TRANSPORTATION KEPT YOU FROM MEDICAL APPOINTMENTS OR FROM GETTING MEDICATIONS?: NO

## 2024-11-27 SDOH — ECONOMIC STABILITY: FOOD INSECURITY: HOW HARD IS IT FOR YOU TO PAY FOR THE VERY BASICS LIKE FOOD, HOUSING, MEDICAL CARE, AND HEATING?: NOT HARD AT ALL

## 2024-11-27 ASSESSMENT — PAIN SCALES - GENERAL
PAINLEVEL_OUTOF10: 0 - NO PAIN

## 2024-11-27 ASSESSMENT — ACTIVITIES OF DAILY LIVING (ADL): LACK_OF_TRANSPORTATION: NO

## 2024-11-27 NOTE — CARE PLAN
Problem: Postpartum  Goal: Experiences normal postpartum course  Outcome: Progressing  Goal: Appropriate maternal -  bonding  Outcome: Progressing   The patient's goals for the shift include      The clinical goals for the shift include VSS, bleeding and swelling minimal, pain controlled with medications, breastfeeding.    Adequate pain relief and stable VS throughout the shift.

## 2024-11-27 NOTE — ANESTHESIA POSTPROCEDURE EVALUATION
Dia Aaron is a 24 y.o., , who had a Vaginal, Spontaneous delivery on 2024 at 38w6d and is now POD1.    She had Neuraxial Anesthesia without immediate complications noted.       Pain well controlled    Vitals:    24 0755   BP: 118/70   Pulse: 66   Resp: 16   Temp: 36.7 °C (98 °F)   SpO2: 98%       Neuraxial site assessed. No visible redness or swelling or drainage. Patient able to ambulate and move all extremities without difficulty. Able to void. No complaints of nausea/vomiting. Tolerating PO intake well. No s/sx of PDPH.     Anesthesia will sign off     Darek Menon MD

## 2024-11-27 NOTE — LACTATION NOTE
Lactation Consultant Note  Lactation Consultation  Reason for Consult: Initial assessment  Consultant Name: Radha Macario RN, IBCLC    Maternal Information  Has mother  before?: No  Infant to breast within first 2 hours of birth?: Yes  Exclusive Pump and Bottle Feed: No    Maternal Assessment  Breast Assessment: Medium, Large, Pendulous, Soft, Warm, Compressible (expressible bilaterally)  Nipple Assessment: Intact, Erect with stimulation, Sore  Areola Assessment: Normal    Infant Assessment  Infant Behavior: Light sleep, Suckles on and off, needs stimulation, Quiet alert  Infant Assessment: Good cupping of tongue    Feeding Assessment  Nutrition Source: Breastmilk  Feeding Method: Nursing at the breast  Feeding Position: Football/seated, Skin to skin, Breast sandwich, Mother needs assistance with latch/positioning  Suck/Feeding: Sustained, Tactile stimulation needed, Audible swallowing with stimulaton  Latch Assessment: Deep latch obtained, Areolar attachment, Maximum assistance is needed, Optimal angle of mouth opening, Comfortable with no pain, Bursts of sucking, swallowing, and rest, Flanged lips, Comfortable latch    LATCH TOOL  Latch: Repeated attempts, hold nipple in mouth, stimulate to suck  Audible Swallowing: A few with stimulation  Type of Nipple: Everted (After stimulation)  Comfort (Breast/Nipple): Soft/non-tender  Hold (Positioning): Full assist, staff holds infant at breast  LATCH Score: 6    Breast Pump       Other OB Lactation Tools       Patient Follow-up  Inpatient Lactation Follow-up Needed : Yes    Other OB Lactation Documentation  Maternal Risk Factors: Hypertension  Infant Risk Factors: Early term birth 37-39 weeks, Low birth weight <2500 g    Recommendations/Summary  Mother states having tried to latch infant in the past hour or so without success, states infant has been a bit spitty. Mother also states she pushed for 15 minutes. Discussed typical  feeding pattern in  the first 24 hours, especially given mother did not push for long. Suggested trying to wake infant to feed at this time with my assistance, mother agreeable. Infant's bowel movement changed and then placed with mother skin to skin. Upon assessment of mother, she has medium/large pendulous breasts that are soft, nipples erect with stimulation and are expressible. Suggested football hold with pillow support, mother agreeable. Placed infant from skin to skin on mother's chest to football hold at left breast. Breast sandwich created and I was able to latch infant deeply to left breast with areolar attachment, lips flanged and chin and nose touching breast. With tactile stimulation infant had sucks with long jaw movement and a few swallows. Mother states latch is comfortable.     I educated mother on feeding infant based on feeding cues, waking infant if it has been 3 hours since last feed, feeding infant on first breast until he unlatches or until tactile stimulation is not keeping him sucking/swallowing at breast. I then recommended burping infant and then trying to latch/feed infant on other breast if still showing feeding cues. Discussed with mother other  feeding pattern of cluster feeding which might happen this evening/overnight. Mother states she does not have a breast pump for home use. Unable to order a breast pump at this time r/t mother's insurance information missing from registration form. Per mother, in the process of obtaining insurance through [employer]. Advised mother to order breast pump when insurance goes through and/or reaching out to [facility] if eligible to obtain/rent a pump. Also gave mother option to be given hand pump tomorrow upon discharge as needed. Outpatient lactation resources discussed with mother. I encouraged mother to call for any questions, concerns or assistance.
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
## 2024-11-27 NOTE — PROGRESS NOTES
Intrapartum Progress Note    Assessment/Plan   Dia Aaron is a 24 y.o.  at 38w6d. DEVANTE: 2024, by Last Menstrual Period.   FHT Category 2- deep variable and early decelerations, baby recovering with mod variability.    Labor phase unknown  FGR, EFW 8%, AC 3%  Anemia, admission hgb = 9.3  GBS neg     Amnioinfusion started  Discussed with patient importance of cervical exams  Anesthesia in to reassess epidural  Hypertensive labs sent   Maternal repositioning  Patient status and plan of care reviewed with Dr. Devine   Anticipate active phase of labor  Anticipate NSVB    FRANCO Fisher    Assessment & Plan  Pregnancy affected by fetal growth restriction ([facility])    Obesity    Pregnancy Problems (from 24 to present)       Problem Noted Diagnosed Resolved    Pregnancy affected by fetal growth restriction ([facility]) 2024 by FRANCO Wasserman  No    Priority:  Medium       Fetal growth restriction antepartum ([facility]) 2024 by FRANCO Villagomez, MARÍA-CNP  No    Priority:  Medium       Overview Addendum 2024  5:27 PM by FRANCO Nuñez     Diagnosed US --> Decreased interval fetal growth. The AC is at the 3% and the EFW is at the 8% percentile  -Normal amniotic fluid volume  -Normal doppler indices with the RI at the 78% percentile  -BPP     [facility]  Weekly BPP/dopplers      AC 3%, EFW 12%         38 weeks gestation of pregnancy ([facility]) 2024 by FRANCO Robles  No    Priority:  Medium       Overview Addendum 2024  2:48 PM by FRANCO Nuñez     Desired provider in labor: [x] CNM  [] Physician  [x] Initial BMI: 44.30  [x] Prenatal Labs:   [x] Rh status: +  [] Genetic Screening:    [x] Baby ASA  [x] Dating confirmation with US    [x] Anatomy US: WNL   [] Patient added to BirthTracks  [x] 1hr GCT at 24-28wks: 95  [] [] [x] Fetal Surveillance (if indicated): yes, in s/o FGR,  "8%ILE    [x] Tdap (27-36wks): declined  [x] RSV (32-36wks) given 10/08/24   [x] Flu Shot: declined  [] COVID vaccine:     [x] Breastfeeding: yes, waiting for pump from insurance  [] Pain management during labor:   [] Postpartum Birth control method:     [x] GBS at 36 wks:  [x] 39 weeks discussion of IOL vs. Expectant management: IOL for FGR  [x] Mode of delivery: vaginal           Anemia during pregnancy in second trimester ([facility]) 7/3/2024 by FRANCO Nuñez  No    Priority:  Medium       Overview Addendum 2024  2:50 PM by FRANCO Nuñez     Hemoglobin 10.1 on   PO iron ordered  Repeat at 24-28 week labs--> hgb 10.1, ferritin 15 retic 1.4%    [x] CBC ordered 10/08/24   -hgb 9.9  -ferritin 13         Obesity, Class III, BMI 40-49.9 (morbid obesity) ([facility]) 2024 by FRANCO Nuñez  No    Priority:  Medium       Overview Addendum 10/8/2024 12:53 PM by FRANCO Mistry, APRN-CNP     Pre-pregnancy BMI 44  Growth 30, 36 weeks  Weekly  testing beginning at 34 weeks    Timing of delivery: 39 0/7 - 39 6/7 wks  *BMI >= 50 at any time in pregnancy: Deliver at Level 4    31w6d Body mass index is 45.92 kg/m².            Primigravida in third trimester ([facility]) 2024 by FRANCO Robles  10/23/2024 by FRANCO Nuñez    Priority:  Medium       Pain of round ligament during pregnancy ([facility]) 2024 by FRANCO Robles  10/8/2024 by FRANCO Mistry, APRN-CNP    Priority:  Medium       GI (gastrointestinal) complaints related to pregnancy, second trimester 2024 by FRANCO Robles  10/8/2024 by Christina A Augusto, APRN-CNM, APRN-CNP    Priority:  Medium               Subjective   Patient very uncomfortable with contractions she is requesting replacement of epidural. Patient is declining any cervical exams at this time. She reports that she is nervous the the exams will \"hurt\" and \"doesn't see " "the point\" in the exam.     Objective   Last Vitals:  Temp Pulse Resp BP MAP Pulse Ox   36.8 °C (98.2 °F) 91 (!) 24 (!) 140/69 98 99 %     Vitals Min/Max Last 24 Hours:  Temp  Min: 36.3 °C (97.3 °F)  Max: 37.1 °C (98.8 °F)  Pulse  Min: 58  Max: 112  Resp  Min: 16  Max: 24  BP  Min: 104/59  Max: 188/90  MAP (mmHg)  Min: 76  Max: 128    Intake/Output:    Intake/Output Summary (Last 24 hours) at 11/26/2024 2000  Last data filed at 11/26/2024 1632  Gross per 24 hour   Intake 500 ml   Output 325 ml   Net 175 ml       Physical Examination:  FHR is 140 , with Early and  Variable decelerations, Recurrent, and a Category II tracing.    [place] reading:  q2 min.   Lab Review:  Labs in chart were reviewed.      "

## 2024-11-27 NOTE — CARE PLAN
The patient's goals for the shift include rest, BF, pain control    The clinical goals for the shift include VSS, bleeding and swelling minimal, pain controlled with non-pharmacologic methods or medications, breastfeeding every 2-3 hours or more, determined by feeding cues     Over the shift, the patient did make progress toward the following goals.       Problem: Vaginal Birth or  Section  Goal: Fetal and maternal status remain reassuring during the birth process  Outcome: Progressing  Goal: Tolerate CRB for IOL placement maintenance until dislodgement/removal 12hrs after placement  Outcome: Progressing  Goal: Prevention of malpresentation/labor dystocia through delivery  Outcome: Progressing  Goal: Demonstrates labor coping techniques through delivery  Outcome: Progressing  Goal: Minimal s/sx of HDP and BP<160/110  Outcome: Progressing  Goal: No s/sx of infection through recovery  Outcome: Progressing  Goal: No s/sx of hemorrhage through recovery  Outcome: Progressing     Problem: Pain - Adult  Goal: Verbalizes/displays adequate comfort level or baseline comfort level  Outcome: Progressing     Problem: Safety - Adult  Goal: Free from fall injury  Outcome: Progressing     Problem: Postpartum  Goal: Experiences normal postpartum course  Outcome: Progressing  Goal: Appropriate maternal -  bonding  Outcome: Progressing  Goal: Establish and maintain infant feeding pattern for adequate nutrition  Outcome: Progressing

## 2024-11-27 NOTE — L&D DELIVERY NOTE
OB Vaginal Delivery Note    2024  Dia Aaron  24 y.o.     Review the Delivery Report for details.     Gestational Age: 38w6d  /Para:   Labor Complications: Fetal Intolerance  Estimated Blood Loss:   Delivery Blood Loss   Intrapartum & Postpartum: 24 0845 - 24    Delivery Admission: 24 - 24         Intrapartum & Postpartum Delivery Admission    Quantitative Blood Loss - (mL) Hospital Encounter 100 mL 100 mL    Total  100 mL 100 mL          Quantitative Blood Loss: 100 mL  Delivery Type: Vaginal, Spontaneous  ROM to Delivery Time: 10h 15m   Sex: Male  [place] Weight: 2.43 kg   1 Minute 5 Minute 10 Minute   Apgar Totals: 8   9          Krishna Aaron [[id number]]      Delivery Providers    Delivering clinician: Florence Roth, APRN-CNM   Provider Role    Kathie Meier, RN Delivery Nurse    Fanta Persaud, RN Nursery Nurse     Resident               Delivery Details:  Presented in patient's room at 1930 for recurrent deep variable and early decelerations. Patient was reporting high levels of discomfort and was attempting to have her epidural readjusted. Patient continued to decline cervical exams. POC discussed with Dr. Devine, amnioinfusion started at  with continued= deep prolonged early and variable decelerations. Patient consented  to exam at  and was found to be complete and +1.  She delivered a viable Male infant with Apgars of 8  and 9 . The patient was put in the dorsal lithotomy position. Delivery was via Vaginal, Spontaneous to a sterile field under Epidural anesthesia. Infant delivered in Vertex Left Occiput Anterior position and was found to have a single tight nuchal cord. Baby was delivered through cord and was then reduced. Anterior and posterior shoulders delivered without difficulty. The cord was clamped twice, cut and 3 vessels were noted. Cord blood was obtained in routine fashion with the following disposition: Lab.    Intact placenta delivered at 11/26/2024  8:50 PM. Placental disposition was lab. Fundal massage performed and fundus found to be firm. Perineum, vagina, cervix were inspected, and the following lacerations were noted:   Krishna Aaron [[id number]]      Lacerations    Episiotomy: None  Perineal laceration: 1st  Periurethral laceration?: Yes  Periurethral laceration repaired?: No  Labial laceration?: Yes  Labial laceration repaired?: No  Vaginal laceration?: Yes  Vaginal laceration repaired?: No  Repair suture: 3-0 Synthetic Suture, 2-0 Synthetic Suture            Patient had a small 1st degree laceration which was repaired with 3-0 synthetic suture. Patient had multiple labial, vaginal, and periurethral lacerations which were hemostatic and well approximated and therefore not repaired. Excellent hemostasis was noted. Needle count correct. Infant and patient in delivery room in good and stable condition.     Florence Roth, MARÍA-MAGDALENAM

## 2024-11-27 NOTE — SIGNIFICANT EVENT
Patient meets criteria for home monitoring of blood pressure post discharge.  Reason:  current gestational hypertension. Met with patient to assess for availability of home BP monitor.  Patient stated she owns home BP monitor. . Patient educated on importance of continuing to monitor BP at home, recording BP on home monitoring log and s/sx of when to call her provider.  Pt verbalized understanding the above information.

## 2024-11-28 ENCOUNTER — PHARMACY VISIT (OUTPATIENT)
Dept: PHARMACY | Facility: CLINIC | Age: 24
End: 2024-11-28
Payer: MEDICARE

## 2024-11-28 VITALS
HEIGHT: 63 IN | OXYGEN SATURATION: 96 % | TEMPERATURE: 97.5 F | SYSTOLIC BLOOD PRESSURE: 116 MMHG | WEIGHT: 263.89 LBS | BODY MASS INDEX: 46.76 KG/M2 | RESPIRATION RATE: 18 BRPM | HEART RATE: 60 BPM | DIASTOLIC BLOOD PRESSURE: 70 MMHG

## 2024-11-28 PROBLEM — O36.5990 PREGNANCY AFFECTED BY FETAL GROWTH RESTRICTION (HHS-HCC): Status: RESOLVED | Noted: 2024-11-25 | Resolved: 2024-11-28

## 2024-11-28 PROBLEM — E66.9 OBESITY: Status: RESOLVED | Noted: 2024-11-25 | Resolved: 2024-11-28

## 2024-11-28 LAB
BLOOD EXPIRATION DATE: NORMAL
DISPENSE STATUS: NORMAL
PRODUCT BLOOD TYPE: 6200
PRODUCT CODE: NORMAL
UNIT ABO: NORMAL
UNIT NUMBER: NORMAL
UNIT RH: NORMAL
UNIT VOLUME: 350
XM INTEP: NORMAL

## 2024-11-28 ASSESSMENT — PAIN SCALES - GENERAL
PAINLEVEL_OUTOF10: 0 - NO PAIN
PAINLEVEL_OUTOF10: 0 - NO PAIN

## 2024-11-28 NOTE — CARE PLAN
Problem: Pain - Adult  Goal: Verbalizes/displays adequate comfort level or baseline comfort level  Outcome: Progressing     Problem: Safety - Adult  Goal: Free from fall injury  Outcome: Progressing     Problem: Postpartum  Goal: Experiences normal postpartum course  Outcome: Progressing  Goal: Appropriate maternal -  bonding  Outcome: Progressing  Goal: Establish and maintain infant feeding pattern for adequate nutrition  Outcome: Progressing       VSS, bleeding and swelling minimal, pain controlled with medications, breastfeeding.

## 2024-11-28 NOTE — DISCHARGE SUMMARY
Discharge Summary    Admission Date: 11/25/2024  Discharge Date: 11/28/2024    Discharge Diagnosis  Pregnancy affected by fetal growth restriction ([facility])    Hospital Course  Delivery Date: 11/26/2024 8:45 PM  Delivery type: Vaginal, Spontaneous   GA at delivery: 38w6d  Outcome: Living  Anesthesia during delivery: Epidural  Intrapartum complications: Fetal Intolerance  Feeding method: Breastfeeding Status: Yes     Procedures: none  Contraception at discharge: will discuss @ 4 weeks postpartum      Pertinent Physical Exam At Time of Discharge    General: Examination reveals a well developed, well nourished, female, in no acute distress. She is alert and cooperative.  Lungs: unlabored and even.  Abdomen: soft, gravid, nontender, nondistended, no abnormal masses, no epigastric pain, obese, fundus soft, nontender, 18 weeks size .  Fundus: firm and below umbilicus.  Perineum: deferred.  Extremities: no redness or tenderness in the calves or thighs, no edema.  Neurological: alert, oriented, normal speech, no focal findings or movement disorder noted.  Psychological: awake and alert; oriented to person, place, and time.    Last Vitals:  Temp Pulse Resp BP MAP Pulse Ox   36.4 °C (97.5 °F) 60 18 116/70 86 96 %     Discharge Meds     Your medication list        START taking these medications        Instructions Last Dose Given Next Dose Due   acetaminophen 325 mg tablet  Commonly known as: Tylenol      Take 3 tablets (975 mg) by mouth every 6 hours if needed for mild pain (1 - 3) or moderate pain (4 - 6).       ibuprofen 600 mg tablet      Take 1 tablet (600 mg) by mouth every 6 hours if needed for mild pain (1 - 3) or moderate pain (4 - 6).              CONTINUE taking these medications        Instructions Last Dose Given Next Dose Due   prenatal vitamin (iron-folic) 27 mg iron-800 mcg folic acid tablet      Take 1 tablet by mouth once daily.              STOP taking these medications      aspirin 81 mg chewable tablet         ferrous sulfate (325 mg ferrous sulfate) tablet                  Where to Get Your Medications        These medications were sent to [facility]  [address], [address], [facility] [address]      Hours: 8AM to 6PM Mon-Fri, 8AM to 4PM Sat, 9AM to 1PM Sun Phone: [phone]   acetaminophen 325 mg tablet  ibuprofen 600 mg tablet          Complications Requiring Follow-Up  To monitor BP @ home daily  To call if 150/100 or greater or headache unrelieved by Tylenol or Motrin    Test Results Pending At Discharge  Pending Labs       Order Current Status    Surgical Pathology Exam - PLACENTA In process            Outpatient Follow-Up  No future appointments.    Dia, please make an appointment to see Nathalie Dhaliwal in 4 weeks    I spent 10 minutes in the professional and overall care of this patient.      MARÍA Rangel-WHITNEY

## 2024-12-04 LAB
LABORATORY COMMENT REPORT: NORMAL
PATH REPORT.FINAL DX SPEC: NORMAL
PATH REPORT.GROSS SPEC: NORMAL
PATH REPORT.RELEVANT HX SPEC: NORMAL
PATH REPORT.TOTAL CANCER: NORMAL

## 2024-12-12 ENCOUNTER — DOCUMENTATION (OUTPATIENT)
Dept: OBSTETRICS AND GYNECOLOGY | Facility: HOSPITAL | Age: 24
End: 2024-12-12
Payer: COMMERCIAL

## 2025-01-06 ENCOUNTER — POSTPARTUM VISIT (OUTPATIENT)
Dept: OBSTETRICS AND GYNECOLOGY | Facility: HOSPITAL | Age: 25
End: 2025-01-06
Payer: COMMERCIAL

## 2025-01-06 VITALS
WEIGHT: 251 LBS | DIASTOLIC BLOOD PRESSURE: 84 MMHG | HEART RATE: 63 BPM | SYSTOLIC BLOOD PRESSURE: 117 MMHG | BODY MASS INDEX: 42.85 KG/M2 | HEIGHT: 64 IN

## 2025-01-06 DIAGNOSIS — Z30.011 ENCOUNTER FOR INITIAL PRESCRIPTION OF CONTRACEPTIVE PILLS: ICD-10-CM

## 2025-01-06 DIAGNOSIS — D64.9 ANEMIA, UNSPECIFIED TYPE: ICD-10-CM

## 2025-01-06 PROBLEM — O36.5990 FETAL GROWTH RESTRICTION ANTEPARTUM (HHS-HCC): Status: RESOLVED | Noted: 2024-09-27 | Resolved: 2025-01-06

## 2025-01-06 PROBLEM — E66.01 OBESITY, CLASS III, BMI 40-49.9 (MORBID OBESITY) (MULTI): Status: RESOLVED | Noted: 2024-06-05 | Resolved: 2025-01-06

## 2025-01-06 PROBLEM — O99.012 ANEMIA DURING PREGNANCY IN SECOND TRIMESTER (HHS-HCC): Status: RESOLVED | Noted: 2024-07-03 | Resolved: 2025-01-06

## 2025-01-06 PROBLEM — Z3A.38 38 WEEKS GESTATION OF PREGNANCY (HHS-HCC): Status: RESOLVED | Noted: 2024-07-28 | Resolved: 2025-01-06

## 2025-01-06 PROBLEM — E66.813 OBESITY, CLASS III, BMI 40-49.9 (MORBID OBESITY): Status: RESOLVED | Noted: 2024-06-05 | Resolved: 2025-01-06

## 2025-01-06 PROCEDURE — 0503F POSTPARTUM CARE VISIT: CPT

## 2025-01-06 PROCEDURE — 99213 OFFICE O/P EST LOW 20 MIN: CPT

## 2025-01-06 RX ORDER — FERROUS SULFATE 325(65) MG
325 TABLET ORAL
Qty: 90 TABLET | Refills: 3 | Status: SHIPPED | OUTPATIENT
Start: 2025-01-06 | End: 2026-01-06

## 2025-01-06 RX ORDER — NORETHINDRONE 0.35 MG/1
1 TABLET ORAL DAILY
Qty: 84 TABLET | Refills: 3 | Status: SHIPPED | OUTPATIENT
Start: 2025-01-06 | End: 2026-01-06

## 2025-01-06 SDOH — ECONOMIC STABILITY: FOOD INSECURITY: WITHIN THE PAST 12 MONTHS, THE FOOD YOU BOUGHT JUST DIDN'T LAST AND YOU DIDN'T HAVE MONEY TO GET MORE.: NEVER TRUE

## 2025-01-06 SDOH — ECONOMIC STABILITY: FOOD INSECURITY: WITHIN THE PAST 12 MONTHS, YOU WORRIED THAT YOUR FOOD WOULD RUN OUT BEFORE YOU GOT MONEY TO BUY MORE.: NEVER TRUE

## 2025-01-06 ASSESSMENT — SOCIAL DETERMINANTS OF HEALTH (SDOH)
WITHIN THE LAST YEAR, HAVE YOU BEEN HUMILIATED OR EMOTIONALLY ABUSED IN OTHER WAYS BY YOUR PARTNER OR EX-PARTNER?: NO
WITHIN THE LAST YEAR, HAVE YOU BEEN KICKED, HIT, SLAPPED, OR OTHERWISE PHYSICALLY HURT BY YOUR PARTNER OR EX-PARTNER?: NO
WITHIN THE LAST YEAR, HAVE YOU BEEN AFRAID OF YOUR PARTNER OR EX-PARTNER?: NO
WITHIN THE LAST YEAR, HAVE TO BEEN RAPED OR FORCED TO HAVE ANY KIND OF SEXUAL ACTIVITY BY YOUR PARTNER OR EX-PARTNER?: NO

## 2025-01-06 ASSESSMENT — EDINBURGH POSTNATAL DEPRESSION SCALE (EPDS)
I HAVE FELT SAD OR MISERABLE: YES, QUITE OFTEN
I HAVE LOOKED FORWARD WITH ENJOYMENT TO THINGS: RATHER LESS THAN I USED TO
I HAVE BEEN SO UNHAPPY THAT I HAVE HAD DIFFICULTY SLEEPING: NOT AT ALL
THE THOUGHT OF HARMING MYSELF HAS OCCURRED TO ME: NEVER
THINGS HAVE BEEN GETTING ON TOP OF ME: YES, SOMETIMES I HAVEN'T BEEN COPING AS WELL AS USUAL
I HAVE BEEN ANXIOUS OR WORRIED FOR NO GOOD REASON: YES, SOMETIMES
I HAVE BEEN SO UNHAPPY THAT I HAVE BEEN CRYING: NO, NEVER
I HAVE BLAMED MYSELF UNNECESSARILY WHEN THINGS WENT WRONG: NO, NEVER
TOTAL SCORE: 7
I HAVE FELT SCARED OR PANICKY FOR NO GOOD REASON: NO, NOT AT ALL
I HAVE BEEN ABLE TO LAUGH AND SEE THE FUNNY SIDE OF THINGS: AS MUCH AS I ALWAYS COULD

## 2025-01-06 ASSESSMENT — LIFESTYLE VARIABLES
HOW OFTEN DO YOU HAVE A DRINK CONTAINING ALCOHOL: NEVER
AUDIT-C TOTAL SCORE: 0
SKIP TO QUESTIONS 9-10: 1
HOW MANY STANDARD DRINKS CONTAINING ALCOHOL DO YOU HAVE ON A TYPICAL DAY: PATIENT DOES NOT DRINK
HOW OFTEN DO YOU HAVE SIX OR MORE DRINKS ON ONE OCCASION: NEVER

## 2025-01-06 NOTE — PROGRESS NOTES
Subjective   24 y.o.  presenting for postpartum follow-up     Delivery Date: 2024  Gestational Age: 38w6d   Type of Delivery: Vaginal, Spontaneous     Pregnancy Problems (from 24 to present)       Problem Noted Diagnosed Resolved    Fetal growth restriction antepartum ([facility]) 2024 by FRANCO Villagomez, MARÍA-CNP  No    Priority:  Medium       Overview Addendum 2024  5:27 PM by FRANCO Nuñez     Diagnosed US --> Decreased interval fetal growth. The AC is at the 3% and the EFW is at the 8% percentile  -Normal amniotic fluid volume  -Normal doppler indices with the RI at the 78% percentile  -BPP     Reviewed at [facility] 10/4  Weekly BPP/dopplers      AC 3%, EFW 12%         38 weeks gestation of pregnancy ([facility]) 2024 by FRANCO Robles  No    Priority:  Medium       Overview Addendum 2024  2:48 PM by FRANCO Nuñez     Desired provider in labor: [x] CNM  [] Physician  [x] Initial BMI: 44.30  [x] Prenatal Labs:   [x] Rh status: +  [] Genetic Screening:    [x] Baby ASA  [x] Dating confirmation with US    [x] Anatomy US: WNL   [] Patient added to BirthTracks  [x] 1hr GCT at 24-28wks: 95  [] [] [x] Fetal Surveillance (if indicated): yes, in s/o FGR, 8%ILE    [x] Tdap (27-36wks): declined  [x] RSV (32-36wks) given 10/08/24   [x] Flu Shot: declined  [] COVID vaccine:     [x] Breastfeeding: yes, waiting for pump from insurance  [] Pain management during labor:   [] Postpartum Birth control method:     [x] GBS at 36 wks:  [x] 39 weeks discussion of IOL vs. Expectant management: IOL for FGR  [x] Mode of delivery: vaginal           Anemia during pregnancy in second trimester ([facility]) 7/3/2024 by Nathalie M Isra, APRN-CNM  No    Priority:  Medium       Overview Addendum 2024  2:50 PM by Nathalie Dhaliwal, MARÍA-WHITNEY     Hemoglobin 10.1 on   PO iron ordered  Repeat at 24-28 week labs--> hgb 10.1, ferritin 15  "retic 1.4%    [x] CBC ordered 10/08/24   -hgb 9.9  -ferritin 13         Obesity, Class III, BMI 40-49.9 (morbid obesity) ([facility]) 2024 by FRANCO Nuñez  No    Priority:  Medium       Overview Addendum 10/8/2024 12:53 PM by FRANCO Mistry, APRN-CNP     Pre-pregnancy BMI 44  Growth 30, 36 weeks  Weekly  testing beginning at 34 weeks    Timing of delivery: 39 0/7 - 39 6/7 wks  *BMI >= 50 at any time in pregnancy: Deliver at Level 4    31w6d Body mass index is 45.92 kg/m².            Primigravida in third trimester ([facility]) 2024 by FRANCO Robles  10/23/2024 by FRANCO Nuñez    Priority:  Medium       Pain of round ligament during pregnancy ([facility]) 2024 by FRANCO Robles  10/8/2024 by FRANCO Mistry, APRN-CNP    Priority:  Medium       GI (gastrointestinal) complaints related to pregnancy, second trimester 2024 by FRANCO Robles  10/8/2024 by FRANCO Mistry, APRN-CNP    Priority:  Medium       Pregnancy affected by fetal growth restriction ([facility]) 2024 by FRANCO Wasserman  2024 by FRANCO Rangel            Concerns: desires birth control, but she is currently undecided; POP vs Nexplanon    Pain: controlled  Lacerations: 1st degree  Lochia: ceased  Sexual Intimacy: No  Contraceptive Method: None  Feeding Method: She is breast feeding exclusively. no breast or nursing problems  Lactation Consult Needed?: No    Birth Trauma: No  Bonding with Baby: well with baby boy, Anthony    Mood:   Postpartum Depression: Medium Risk (2025)    [place]  Depression Scale     Last EPDS Total Score: 7     Last EPDS Self Harm Result: Never     Last pap: 2024    Objective    /84 (BP Location: Left arm, Patient Position: Sitting, BP Cuff Size: Adult long)   Pulse 63   Ht 1.626 m (5' 4\")   Wt 114 kg (251 lb)   LMP  (LMP Unknown)   Breastfeeding Yes  "  BMI 43.08 kg/m²    Physical Exam  Constitutional:       Appearance: Normal appearance.   Genitourinary:      Vulva normal.   HENT:      Head: Normocephalic and atraumatic.      Mouth/Throat:      Mouth: Mucous membranes are moist.   Cardiovascular:      Rate and Rhythm: Normal rate and regular rhythm.      Heart sounds: Normal heart sounds.   Pulmonary:      Effort: Pulmonary effort is normal.      Breath sounds: Normal breath sounds.   Abdominal:      Comments: Uterus not palpated   Musculoskeletal:         General: Normal range of motion.      Cervical back: Normal range of motion and neck supple.   Neurological:      Mental Status: She is alert and oriented to person, place, and time.   Skin:     General: Skin is warm and dry.   Psychiatric:         Mood and Affect: Mood normal.         Behavior: Behavior normal.         Thought Content: Thought content normal.         Judgment: Judgment normal.          Plan    Advised to call office for breast complaints, abnormal bleeding, mood changes, or other concerning symptoms.   Cleared to resume normal activity as desired  Placed Rx for POP to pharmacy on file; if patient decides to proceed with Nexplanon placement, patient aware to schedule appointment  Continue to abstain or use barrier method for 1 week after begin POP to prevent pregnancy; patient verbalized understanding     Problem List Items Addressed This Visit             ICD-10-CM    Routine postpartum follow-up - Primary Z39.2    Anemia D64.9    Relevant Medications    ferrous sulfate, 325 mg ferrous sulfate, tablet    Encounter for initial prescription of contraceptive pills Z30.011    Relevant Medications    norethindrone (Micronor) 0.35 mg tablet       FRANCO Nuñez

## 2025-01-24 RX ORDER — ACETAMINOPHEN 325 MG/1
975 TABLET ORAL EVERY 6 HOURS PRN
Qty: 56 TABLET | Refills: 0 | OUTPATIENT
Start: 2025-01-24

## 2025-05-30 ENCOUNTER — APPOINTMENT (OUTPATIENT)
Dept: PRIMARY CARE | Facility: CLINIC | Age: 25
End: 2025-05-30
Payer: COMMERCIAL

## 2025-06-11 ENCOUNTER — TELEPHONE (OUTPATIENT)
Dept: OBSTETRICS AND GYNECOLOGY | Facility: HOSPITAL | Age: 25
End: 2025-06-11
Payer: COMMERCIAL

## 2025-06-11 DIAGNOSIS — Z30.011 ENCOUNTER FOR INITIAL PRESCRIPTION OF CONTRACEPTIVE PILLS: ICD-10-CM

## 2025-06-12 RX ORDER — NORETHINDRONE 0.35 MG/1
1 TABLET ORAL DAILY
Qty: 84 TABLET | Refills: 2 | Status: SHIPPED | OUTPATIENT
Start: 2025-06-12 | End: 2026-06-12

## 2025-06-12 NOTE — TELEPHONE ENCOUNTER
RN returned patient call. Patient name and  verified. Patient requesting birth control refill. Pharmacy verified. Order pended. Pharmacy will notify patient when ready. Patient verbalized understanding. All questions and concerns addressed.  JENNIE Hawthorne